# Patient Record
Sex: FEMALE | Race: BLACK OR AFRICAN AMERICAN | Employment: FULL TIME | URBAN - METROPOLITAN AREA
[De-identification: names, ages, dates, MRNs, and addresses within clinical notes are randomized per-mention and may not be internally consistent; named-entity substitution may affect disease eponyms.]

---

## 2017-04-11 ENCOUNTER — OFFICE VISIT (OUTPATIENT)
Dept: ORTHOPEDIC SURGERY | Age: 57
End: 2017-04-11

## 2017-04-11 VITALS
HEART RATE: 84 BPM | DIASTOLIC BLOOD PRESSURE: 62 MMHG | WEIGHT: 176 LBS | RESPIRATION RATE: 18 BRPM | OXYGEN SATURATION: 99 % | HEIGHT: 66 IN | SYSTOLIC BLOOD PRESSURE: 132 MMHG | BODY MASS INDEX: 28.28 KG/M2 | TEMPERATURE: 98.2 F

## 2017-04-11 DIAGNOSIS — M50.30 DDD (DEGENERATIVE DISC DISEASE), CERVICAL: ICD-10-CM

## 2017-04-11 DIAGNOSIS — M47.812 CERVICAL FACET JOINT SYNDROME: Primary | ICD-10-CM

## 2017-04-11 DIAGNOSIS — M79.18 MYOFASCIAL PAIN: ICD-10-CM

## 2017-04-11 DIAGNOSIS — Z72.0 TOBACCO USE: ICD-10-CM

## 2017-04-11 RX ORDER — GABAPENTIN 300 MG/1
CAPSULE ORAL
Refills: 2 | COMMUNITY
Start: 2017-03-27 | End: 2022-10-25

## 2017-04-11 RX ORDER — PANTOPRAZOLE SODIUM 40 MG/1
TABLET, DELAYED RELEASE ORAL AS NEEDED
Refills: 4 | COMMUNITY
Start: 2017-03-26

## 2017-04-11 RX ORDER — PREDNISONE 10 MG/1
TABLET ORAL
Qty: 32 TAB | Refills: 0 | Status: SHIPPED | OUTPATIENT
Start: 2017-04-11 | End: 2017-05-22 | Stop reason: ALTCHOICE

## 2017-04-11 RX ORDER — PREDNISONE 50 MG/1
TABLET ORAL
Refills: 0 | COMMUNITY
Start: 2017-03-26 | End: 2017-04-11 | Stop reason: ALTCHOICE

## 2017-04-11 RX ORDER — LISINOPRIL AND HYDROCHLOROTHIAZIDE 12.5; 2 MG/1; MG/1
TABLET ORAL
Refills: 4 | COMMUNITY
Start: 2017-03-26

## 2017-04-11 RX ORDER — CYCLOBENZAPRINE HCL 10 MG
10 TABLET ORAL EVERY EVENING
Qty: 30 TAB | Refills: 1 | Status: SHIPPED | OUTPATIENT
Start: 2017-04-11 | End: 2017-05-22 | Stop reason: SDUPTHER

## 2017-04-11 RX ORDER — DICLOFENAC SODIUM 75 MG/1
75 TABLET, DELAYED RELEASE ORAL 2 TIMES DAILY
Qty: 60 TAB | Refills: 1 | Status: SHIPPED | OUTPATIENT
Start: 2017-04-11 | End: 2017-09-09 | Stop reason: SDUPTHER

## 2017-04-11 RX ORDER — SIMVASTATIN 40 MG/1
TABLET, FILM COATED ORAL
Refills: 1 | COMMUNITY
Start: 2017-03-26 | End: 2021-03-25

## 2017-04-11 NOTE — MR AVS SNAPSHOT
Visit Information Date & Time Provider Department Dept. Phone Encounter #  
 4/11/2017  2:00 PM Reece Ponce MD [facility] [phone] [id number] Follow-up Instructions Return in about 4 weeks (around 5/9/2017) for Medication follow up. Upcoming Health Maintenance Date Due Hepatitis C Screening 1960 Pneumococcal 19-64 Medium Risk (1 of 1 - PPSV23) 7/3/1980 DTaP/Tdap/Td series (1 - Tdap) 7/3/1982 PAP AKA CERVICAL CYTOLOGY 7/3/1982 FOBT Q 1 YEAR AGE 50-75 7/3/2011 BREAST CANCER SCRN MAMMOGRAM 12/18/2014 INFLUENZA AGE 9 TO ADULT 8/1/2016 Allergies as of 4/11/2017  Review Complete On: 4/11/2017 By: Reece Ponce MD  
 No Known Allergies Current Immunizations  Never Reviewed No immunizations on file. Not reviewed this visit You Were Diagnosed With   
  
 Codes Comments Cervical facet joint syndrome    -  Primary ICD-10-CM: M12.88 ICD-9-CM: 723.8 Myofascial pain     ICD-10-CM: M79.1 ICD-9-CM: 729.1 DDD (degenerative disc disease), cervical     ICD-10-CM: M50.30 ICD-9-CM: 722.4 Tobacco use     ICD-10-CM: Z72.0 ICD-9-CM: 305.1 Vitals BP Pulse Temp Resp Height(growth percentile) Weight(growth percentile) 132/62 84 98.2 °F (36.8 °C) (Oral) 18 5' 6\" (1.676 m) 176 lb (79.8 kg) SpO2 BMI OB Status Smoking Status 99% 28.41 kg/m2 Menopause Current Every Day Smoker BMI and BSA Data Body Mass Index Body Surface Area  
 28.41 kg/m 2 1.93 m 2 Preferred Pharmacy Pharmacy Name Phone [id number] [place], [address] [facility] [phone] Your Updated Medication List  
  
   
This list is accurate as of: 4/11/17  3:15 PM.  Always use your most recent med list.  
  
  
  
  
 cyclobenzaprine 10 mg tablet Commonly known as:  FLEXERIL Take 1 Tab by mouth every evening. PRN Muscle Spasms. Indications: MUSCLE SPASM diclofenac EC 75 mg EC tablet Commonly known as:  VOLTAREN Take 1 Tab by mouth two (2) times a day.  
  
 gabapentin 300 mg capsule Commonly known as:  NEURONTIN  
TAKE 1 CAPSULE (300 MG) BY ORAL ROUTE 2 TIMES PER DAY  
  
 lisinopril-hydroCHLOROthiazide 20-12.5 mg per tablet Commonly known as:  PRINZIDE, ZESTORETIC  
TAKE ONE TABLET BY MOUTH ONCE A DAY pantoprazole 40 mg tablet Commonly known as:  PROTONIX  
TAKE ONE TABLET BY MOUTH ONCE A DAY  
  
 predniSONE 10 mg tablet Commonly known as:  DELTASONE  
6 pills Day1, 5 pills Day 2, 4 pills Day 3&4, 3 pills Day 5&6, 2 pills Day 7&8,1 pill Day 9&10, 1/2 pill Day 11&12.  
  
 simvastatin 40 mg tablet Commonly known as:  ZOCOR  
TAKE ONE TABLET BY MOUTH TABLET EVERY DAY IN THE EVENING Prescriptions Sent to Pharmacy Refills  
 diclofenac EC (VOLTAREN) 75 mg EC tablet 1 Sig: Take 1 Tab by mouth two (2) times a day. Class: Normal  
 Pharmacy: [facility] #[id number] [address] Ph #: [phone] Route: Oral  
 cyclobenzaprine (FLEXERIL) 10 mg tablet 1 Sig: Take 1 Tab by mouth every evening. PRN Muscle Spasms. Indications: MUSCLE SPASM Class: Normal  
 Pharmacy: [facility]/pharmacy #[id number] [address] Ph #: [phone] Route: Oral  
 predniSONE (DELTASONE) 10 mg tablet 0 Si pills Day1, 5 pills Day 2, 4 pills Day 3&4, 3 pills Day 5&6, 2 pills Day 7&8,1 pill Day 9&10, 1/2 pill Day 11&12. Class: Normal  
 Pharmacy: [facility]/pharmacy #[id number] [address] Ph #: [phone] Follow-up Instructions Return in about 4 weeks (around 2017) for Medication follow up. Patient Instructions Neck Arthritis: Exercises Your Care Instructions Here are some examples of typical rehabilitation exercises for your condition. Start each exercise slowly. Ease off the exercise if you start to have pain. Your doctor or physical therapist will tell you when you can start these exercises and which ones will work best for you. How to do the exercises Neck stretches to the side 1. This stretch works best if you keep your shoulder down as you lean away from it. To help you remember to do this, start by relaxing your shoulders and lightly holding on to your thighs or your chair. 2. Tilt your head toward your shoulder and hold for 15 to 30 seconds. Let the weight of your head stretch your muscles. 3. Repeat 2 to 4 times toward each shoulder. Chin tuck 1. Lie on the floor with a rolled-up towel under your neck. Your head should be touching the floor. 2. Slowly bring your chin toward your chest. 
3. Hold for a count of 6, and then relax for up to 10 seconds. 4. Repeat 8 to 12 times. Active cervical rotation 1. Sit in a firm chair, or stand up straight. 2. Keeping your chin level, turn your head to the right, and hold for 15 to 30 seconds. 3. Turn your head to the left and hold for 15 to 30 seconds. 4. Repeat 2 to 4 times to each side. Shoulder blade squeeze 1. While standing, squeeze your shoulder blades together. 2. Do not raise your shoulders up as you are squeezing. 3. Hold for 6 seconds. 4. Repeat 8 to 12 times. Shoulder rolls 1. Sit comfortably with your feet shoulder-width apart. You can also do this exercise standing up. 2. Roll your shoulders up, then back, and then down in a smooth, circular motion. 3. Repeat 2 to 4 times. Follow-up care is a key part of your treatment and safety. Be sure to make and go to all appointments, and call your doctor if you are having problems. It's also a good idea to know your test results and keep a list of the medicines you take. Where can you learn more? Go to http://steven-lewis.info/. Enter X272 in the search box to learn more about \"Neck Arthritis: Exercises. \" Current as of: May 23, 2016 Content Version: 11.2 © 5738-9593 Healthwise, [employer]. Care instructions adapted under license by [employer] (which disclaims liability or warranty for this information). If you have questions about a medical condition or this instruction, always ask your healthcare professional. [address] any warranty or liability for your use of this information. Learning About Benefits From Quitting Smoking How does quitting smoking make you healthier? If you're thinking about quitting smoking, you may have a few reasons to be smoke-free. Your health may be one of them. · When you quit smoking, you lower your risks for cancer, lung disease, heart attack, stroke, blood vessel disease, and blindness from macular degeneration. · When you're smoke-free, you get sick less often, and you heal faster. You are less likely to get colds, flu, bronchitis, and pneumonia. · As a nonsmoker, you may find that your mood is better and you are less stressed. When and how will you feel healthier? Quitting has real health benefits that start from day 1 of being smoke-free. And the longer you stay smoke-free, the healthier you get and the better you feel. The first hours · After just 20 minutes, your blood pressure and heart rate go down. That means there's less stress on your heart and blood vessels. · Within 12 hours, the level of carbon monoxide in your blood drops back to normal. That makes room for more oxygen. With more oxygen in your body, you may notice that you have more energy than when you smoked. After 2 weeks · Your lungs start to work better. · Your risk of heart attack starts to drop. After 1 month · When your lungs are clear, you cough less and breathe deeper, so it's easier to be active. · Your sense of taste and smell return. That means you can enjoy food more than you have since you started smoking. Over the years · After 1 year, your risk of heart disease is half what it would be if you kept smoking. · After 5 years, your risk of stroke starts to shrink. Within a few years after that, it's about the same as if you'd never smoked. · After 10 years, your risk of dying from lung cancer is cut by about half. And your risk for many other types of cancer is lower too. How would quitting help others in your life? When you quit smoking, you improve the health of everyone who now breathes in your smoke. · Their heart, lung, and cancer risks drop, much like yours. · They are sick less. For babies and small children, living smoke-free means they're less likely to have ear infections, pneumonia, and bronchitis. · If you're a woman who is or will be pregnant someday, quitting smoking means a healthier . · Children who are close to you are less likely to become adult smokers. Where can you learn more? Go to http://stevenInteractive Motion Technologieslewis.info/. Enter [id number] [id number] [id number] in the search box to learn more about \"Learning About Benefits From Quitting Smoking. \" Current as of: May 26, 2016 Content Version: 11.2 © 4926-2795 [employer]. Care instructions adapted under license by [employer] (which disclaims liability or warranty for this information). If you have questions about a medical condition or this instruction, always ask your healthcare professional. [place] [address] any warranty or liability for your use of this information. Stopping Smoking: Care Instructions Your Care Instructions Cigarette smokers crave the nicotine in cigarettes. Giving it up is much harder than simply changing a habit. Your body has to stop craving the nicotine. It is hard to quit, but you can do it. There are many tools that people use to quit smoking. You may find that combining tools works best for you. There are several steps to quitting. First you get ready to quit. Then you get support to help you.  After that, you learn new skills and behaviors to become a nonsmoker. For many people, a necessary step is getting and using medicine. Your doctor will help you set up the plan that best meets your needs. You may want to attend a smoking cessation program to help you quit smoking. When you choose a program, look for one that has proven success. Ask your doctor for ideas. You will greatly increase your chances of success if you take medicine as well as get counseling or join a cessation program. 
Some of the changes you feel when you first quit tobacco are uncomfortable. Your body will miss the nicotine at first, and you may feel short-tempered and grumpy. You may have trouble sleeping or concentrating. Medicine can help you deal with these symptoms. You may struggle with changing your smoking habits and rituals. The last step is the tricky one: Be prepared for the smoking urge to continue for a time. This is a lot to deal with, but keep at it. You will feel better. Follow-up care is a key part of your treatment and safety. Be sure to make and go to all appointments, and call your doctor if you are having problems. Its also a good idea to know your test results and keep a list of the medicines you take. How can you care for yourself at home? · Ask your family, friends, and coworkers for support. You have a better chance of quitting if you have help and support. · Join a support group, such as Nicotine Anonymous, for people who are trying to quit smoking. · Consider signing up for a smoking cessation program, such as the American Lung Association's Freedom from Smoking program. 
· Set a quit date. Pick your date carefully so that it is not right in the middle of a big deadline or stressful time. Once you quit, do not even take a puff. Get rid of all ashtrays and lighters after your last cigarette. Clean your house and your clothes so that they do not smell of smoke. · Learn how to be a nonsmoker.  Think about ways you can avoid those things that make you reach for a cigarette. ¨ Avoid situations that put you at greatest risk for smoking. For some people, it is hard to have a drink with friends without smoking. For others, they might skip a coffee break with coworkers who smoke. ¨ Change your daily routine. Take a different route to work or eat a meal in a different place. · Cut down on stress. Calm yourself or release tension by doing an activity you enjoy, such as reading a book, taking a hot bath, or gardening. · Talk to your doctor or pharmacist about nicotine replacement therapy, which replaces the nicotine in your body. You still get nicotine but you do not use tobacco. Nicotine replacement products help you slowly reduce the amount of nicotine you need. These products come in several forms, many of them available over-the-counter: ¨ Nicotine patches ¨ Nicotine gum and lozenges ¨ Nicotine inhaler · Ask your doctor about bupropion (Wellbutrin) or varenicline (Chantix), which are prescription medicines. They do not contain nicotine. They help you by reducing withdrawal symptoms, such as stress and anxiety. · Some people find hypnosis, acupuncture, and massage helpful for ending the smoking habit. · Eat a healthy diet and get regular exercise. Having healthy habits will help your body move past its craving for nicotine. · Be prepared to keep trying. Most people are not successful the first few times they try to quit. Do not get mad at yourself if you smoke again. Make a list of things you learned and think about when you want to try again, such as next week, next month, or next year. Where can you learn more? Go to http://steven-lewis.info/. Enter B441 in the search box to learn more about \"Stopping Smoking: Care Instructions. \" Current as of: May 26, 2016 Content Version: 11.2 © 3827-3201 [employer].  Care instructions adapted under license by [address] (which disclaims liability or warranty for this information). If you have questions about a medical condition or this instruction, always ask your healthcare professional. [address] any warranty or liability for your use of this information. Introducing [facility]! [facility] introduces Cherry patient portal. Now you can access parts of your medical record, email your doctor's office, and request medication refills online. 1. In your internet browser, go to [email or web address] 2. Click on the First Time User? Click Here link in the Sign In box. You will see the New Member Sign Up page. 3. Enter your Cherry Access Code exactly as it appears below. You will not need to use this code after youve completed the sign-up process. If you do not sign up before the expiration date, you must request a new code. · Cherry Access Code: [email or web address] Expires: 5/30/2017  6:37 PM 
 
4. Enter the last four digits of your Social Security Number (xxxx) and Date of Birth (mm/dd/yyyy) as indicated and click Submit. You will be taken to the next sign-up page. 5. Create a Cherry ID. This will be your Cherry login ID and cannot be changed, so think of one that is secure and easy to remember. 6. Create a Cherry password. You can change your password at any time. 7. Enter your Password Reset Question and Answer. This can be used at a later time if you forget your password. 8. Enter your e-mail address. You will receive e-mail notification when new information is available in [address] 9. Click Sign Up. You can now view and download portions of your medical record. 10. Click the Download Summary menu link to download a portable copy of your medical information. If you have questions, please visit the Frequently Asked Questions section of the Cherry website.  Remember, Cherry is NOT to be used for urgent needs. For medical emergencies, dial 911. Now available from your iPhone and Android! Please provide this summary of care documentation to your next provider. Your primary care clinician is listed as Braulio Hinson. If you have any questions after today's visit, please call [phone].

## 2017-04-11 NOTE — PROGRESS NOTES
[facility]  [address] [place], [facility] [address]  Phone: [phone]  Fax: [phone]          HISTORY OF PRESENT ILLNESS:  Elza Goldsmith is a 55 y.o. right handed female with history of cervical pain x 3 years. She c/o pain in the neck that radiates to the right shoulder and down the arm. Pt also c/o numbness below the right elbow. She states that her neck pain is most severe at this time and states that her pain has progressively worsened over the last 18 months. Her pain is better when sitting, standing, and lying down and worse when changing positions, coughing/sneezing, lifting, and bending forward. She admits that her arm occasionally shakes with heavy lifting. Pt denies any difficulty using buttons or issues with balance but admits that she trips about 7 x a day. She has tried physical therapy with minimal relief. Pt has tried OTC Tylenol, Advil, and BC powders. She currently takes Neurontin 300 mg 2 tabs daily. Pt admits to sedation with the Neurontin. She denies any renal issues. Pt has tried Flexeril QHS in the past with benefit. She has used Voltaren 1% gel in the past. Pt at this time desires to proceed with medication evaluation. Of note, Pt is a smoker. 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 Pain Scale: 4/10     PCP: Michelle Aggarwal MD      History reviewed. No pertinent past medical history. Social History     Social History    Marital status: UNKNOWN     Spouse name: N/A    Number of children: N/A    Years of education: N/A     Occupational History    Not on file.      Social History Main Topics    Smoking status: Current Every Day Smoker     Packs/day: 1.00     Years: 25.00     Types: Cigarettes    Smokeless tobacco: Never Used    Alcohol use No    Drug use: No    Sexual activity: Yes     Partners: Male     Other Topics Concern    Not on file     Social History Narrative    No narrative on file       Current Outpatient Prescriptions   Medication Sig Dispense Refill    gabapentin (NEURONTIN) 300 mg capsule TAKE 1 CAPSULE (300 MG) BY ORAL ROUTE 2 TIMES PER DAY  2    lisinopril-hydroCHLOROthiazide (PRINZIDE, ZESTORETIC) 20-12.5 mg per tablet TAKE ONE TABLET BY MOUTH ONCE A DAY  4    pantoprazole (PROTONIX) 40 mg tablet TAKE ONE TABLET BY MOUTH ONCE A DAY  4    simvastatin (ZOCOR) 40 mg tablet TAKE ONE TABLET BY MOUTH TABLET EVERY DAY IN THE EVENING  1    diclofenac EC (VOLTAREN) 75 mg EC tablet Take 1 Tab by mouth two (2) times a day. 60 Tab 1    cyclobenzaprine (FLEXERIL) 10 mg tablet Take 1 Tab by mouth every evening. PRN Muscle Spasms. Indications: MUSCLE SPASM 30 Tab 1    predniSONE (DELTASONE) 10 mg tablet 6 pills Day1, 5 pills Day 2, 4 pills Day 3&4, 3 pills Day 5&6, 2 pills Day 7&8,1 pill Day 9&10, 1/2 pill Day 11&12. 32 Tab 0       No Known Allergies    REVIEW OF SYSTEMS    Constitutional: Negative for fever, chills, or weight change. Respiratory: Negative for cough or shortness of breath. Cardiovascular: Negative for chest pain or palpitations. Gastrointestinal: Negative for acid reflux, change in bowel habits, or constipation. Genitourinary: Negative for dysuria and flank pain. Musculoskeletal: Positive for cervical pain. Skin: Negative for rash. Neurological: Positive for numbness below the right elbow. Negative for headaches, dizziness. Endo/Heme/Allergies: Negative for increased bruising. Psychiatric/Behavioral: Negative for difficulty with sleep. PHYSICAL EXAMINATION  Visit Vitals    /62    Pulse 84    Temp 98.2 °F (36.8 °C) (Oral)    Resp 18    Ht 5' 6\" (1.676 m)    Wt 176 lb (79.8 kg)    SpO2 99%    BMI 28.41 kg/m2       Constitutional: Awake, alert, and in no acute distress  HEENT: Normocephalic. Atraumatic. Oropharynx is moist and clear. PERRL. EOMI.  Sclerae are nonicteric  Heart: Regular rate and rhythm  Lungs: Clear to auscultation bilaterally  Abdomen: Soft and nontender. Bowel sounds are present  Neurological: 1+ symmetrical DTRs in the upper extremities. 1+ symmetrical DTRs in the lower extremities. Sensation to light touch is intact. Negative Lou's sign bilaterally. Skin: warm, dry, and intact. Musculoskeletal: Decreased left cervical flexion. Good ROM with right cervical flexion. Very tight across the upper trapezius with a trigger point in the left. No pain with extension, axial loading, or forward flexion. No pain with internal or external rotation of her hips. Negative straight leg raise bilaterally. Biceps  Triceps Deltoids Wrist Ext Wrist Flex Hand Intrin   Right +4/5 +4/5 +4/5 +4/5 +4/5 +4/5   Left +4/5 +4/5 +4/5 +4/5 +4/5 +4/5      Hip Flex  Quads Hamstrings Ankle DF EHL Ankle PF   Right +4/5 +4/5 +4/5 +4/5 +4/5 +4/5   Left +4/5 +4/5 +4/5 +4/5 +4/5 +4/5     IMAGING:    Cervical spine MRI from 03/16/2017 was personally reviewed with the Pt and demonstrated:    Results from Hospital Encounter encounter on 03/16/17   MRI CERV SPINE WO CONT   Narrative MRI OF THE CERVICAL SPINE    INDICATION: Spinal stenosis, cervical region. Chronic neck, bilateral shoulder,  and bilateral elbow pain. COMPARISON: No relevant studies. TECHNIQUE: Multiplanar, multisequence MRI of the cervical spine was performed  without intravenous contrast.    FINDINGS:    Osseous Structures: There is reversal of the normal cervical lordosis. There is  trace retrolisthesis at C5-C6. Small osteophytes are seen extending from C4  through C6. No abnormal marrow edema. Posterior fossa and Craniocervical Junction: Unremarkable. Spinal Cord: Normal Signal.    Levels:    C2-C3: No significant central canal or foraminal stenosis. C3-C4: No significant central canal or foraminal stenosis. C4-C5: No significant central canal or foraminal stenosis.     C5-C6: Diffuse disc osteophyte complex posterior lateral osteophytic ridging,  and left greater than right facet/joint hypertrophy. Moderate left foraminal  stenosis. Mild effacement of ventral CSF signal without central canal stenosis. C6-C7: Minimal disc bulge. Mild bilateral facet and uncovertebral joint  hypertrophy. No significant stenosis. C7-T1: No significant central canal or foraminal stenosis. Impression IMPRESSION:    1. Moderate left foraminal stenosis at C5-C6. 2.  No central canal stenosis at any level. Normal spinal cord signal.          ASSESSMENT   Brett Sands was seen today for neck pain and new patient. Diagnoses and all orders for this visit:    Cervical facet joint syndrome  -     diclofenac EC (VOLTAREN) 75 mg EC tablet; Take 1 Tab by mouth two (2) times a day. -     predniSONE (DELTASONE) 10 mg tablet; 6 pills Day1, 5 pills Day 2, 4 pills Day 3&4, 3 pills Day 5&6, 2 pills Day 7&8,1 pill Day 9&10, 1/2 pill Day 11&12. Myofascial pain  -     cyclobenzaprine (FLEXERIL) 10 mg tablet; Take 1 Tab by mouth every evening. PRN Muscle Spasms. Indications: MUSCLE SPASM    DDD (degenerative disc disease), cervical    Tobacco use         IMPRESSION AND PLAN:  Javon Medley is a 55 y.o. right handed female with history of cervical pain x 3 years. She c/o pain in the neck that radiates to the right shoulder and down the arm. Pt also c/o numbness below the right elbow. She states that her neck pain is most severe at this time and states that her pain has progressively worsened over the last 18 months. 1) Pt was given information on cervical arthritis exercises. 2) Discussed treatment options with the Pt, including physical therapy with dry needling and cervical steroid injections. 3) She was prescribed a large prednisone taper. 4) Pt was also prescribed Voltaren 75 mg 1 tab BID. 5) She was prescribed Flexeril 10 mg 1 tab QHS prn muscle spasm. 6) I recommended the Pt her Neurontin at night to address her daytime sedation.     7) I strongly encouraged the Pt to quit smoking and gave information on smoking cessation. 8) Ms. Bradly Wright has a reminder for a \"due or due soon\" health maintenance. I have asked that she contact her primary care provider, Allan Griffith MD, for follow-up on this health maintenance. 9)  reviewed. 10) Pt will follow-up in 1 month. Written by Elier Love, as dictated by Kelsey Campos MD.  I, Dr. Kelsey Campos confirm that all documentation is accurate.

## 2017-04-11 NOTE — PATIENT INSTRUCTIONS
Neck Arthritis: Exercises  Your Care Instructions  Here are some examples of typical rehabilitation exercises for your condition. Start each exercise slowly. Ease off the exercise if you start to have pain. Your doctor or physical therapist will tell you when you can start these exercises and which ones will work best for you. How to do the exercises  Neck stretches to the side    1. This stretch works best if you keep your shoulder down as you lean away from it. To help you remember to do this, start by relaxing your shoulders and lightly holding on to your thighs or your chair. 2. Tilt your head toward your shoulder and hold for 15 to 30 seconds. Let the weight of your head stretch your muscles. 3. Repeat 2 to 4 times toward each shoulder. Chin tuck    1. Lie on the floor with a rolled-up towel under your neck. Your head should be touching the floor. 2. Slowly bring your chin toward your chest.  3. Hold for a count of 6, and then relax for up to 10 seconds. 4. Repeat 8 to 12 times. Active cervical rotation    1. Sit in a firm chair, or stand up straight. 2. Keeping your chin level, turn your head to the right, and hold for 15 to 30 seconds. 3. Turn your head to the left and hold for 15 to 30 seconds. 4. Repeat 2 to 4 times to each side. Shoulder blade squeeze    1. While standing, squeeze your shoulder blades together. 2. Do not raise your shoulders up as you are squeezing. 3. Hold for 6 seconds. 4. Repeat 8 to 12 times. Shoulder rolls    1. Sit comfortably with your feet shoulder-width apart. You can also do this exercise standing up. 2. Roll your shoulders up, then back, and then down in a smooth, circular motion. 3. Repeat 2 to 4 times. Follow-up care is a key part of your treatment and safety. Be sure to make and go to all appointments, and call your doctor if you are having problems. It's also a good idea to know your test results and keep a list of the medicines you take.   Where can you learn more? Go to http://steven-lewis.info/. Enter Y811 in the search box to learn more about \"Neck Arthritis: Exercises. \"  Current as of: May 23, 2016  Content Version: 11.2  © 7237-1116 Limk. Care instructions adapted under license by [employer] (which disclaims liability or warranty for this information). If you have questions about a medical condition or this instruction, always ask your healthcare professional. [address] any warranty or liability for your use of this information. Learning About Benefits From Quitting Smoking  How does quitting smoking make you healthier? If you're thinking about quitting smoking, you may have a few reasons to be smoke-free. Your health may be one of them. · When you quit smoking, you lower your risks for cancer, lung disease, heart attack, stroke, blood vessel disease, and blindness from macular degeneration. · When you're smoke-free, you get sick less often, and you heal faster. You are less likely to get colds, flu, bronchitis, and pneumonia. · As a nonsmoker, you may find that your mood is better and you are less stressed. When and how will you feel healthier? Quitting has real health benefits that start from day 1 of being smoke-free. And the longer you stay smoke-free, the healthier you get and the better you feel. The first hours  · After just 20 minutes, your blood pressure and heart rate go down. That means there's less stress on your heart and blood vessels. · Within 12 hours, the level of carbon monoxide in your blood drops back to normal. That makes room for more oxygen. With more oxygen in your body, you may notice that you have more energy than when you smoked. After 2 weeks  · Your lungs start to work better. · Your risk of heart attack starts to drop. After 1 month  · When your lungs are clear, you cough less and breathe deeper, so it's easier to be active.   · Your sense of taste and smell return. That means you can enjoy food more than you have since you started smoking. Over the years  · After 1 year, your risk of heart disease is half what it would be if you kept smoking. · After 5 years, your risk of stroke starts to shrink. Within a few years after that, it's about the same as if you'd never smoked. · After 10 years, your risk of dying from lung cancer is cut by about half. And your risk for many other types of cancer is lower too. How would quitting help others in your life? When you quit smoking, you improve the health of everyone who now breathes in your smoke. · Their heart, lung, and cancer risks drop, much like yours. · They are sick less. For babies and small children, living smoke-free means they're less likely to have ear infections, pneumonia, and bronchitis. · If you're a woman who is or will be pregnant someday, quitting smoking means a healthier . · Children who are close to you are less likely to become adult smokers. Where can you learn more? Go to http://steven-lewis.info/. Enter 052 [id number] [id number] in the search box to learn more about \"Learning About Benefits From Quitting Smoking. \"  Current as of: May 26, 2016  Content Version: 11.2  © 5105-9536 Talyst, Incorporated. Care instructions adapted under license by [employer] (which disclaims liability or warranty for this information). If you have questions about a medical condition or this instruction, always ask your healthcare professional. [place] [address] any warranty or liability for your use of this information. Stopping Smoking: Care Instructions  Your Care Instructions  Cigarette smokers crave the nicotine in cigarettes. Giving it up is much harder than simply changing a habit. Your body has to stop craving the nicotine. It is hard to quit, but you can do it. There are many tools that people use to quit smoking.  You may find that combining tools works best for you. There are several steps to quitting. First you get ready to quit. Then you get support to help you. After that, you learn new skills and behaviors to become a nonsmoker. For many people, a necessary step is getting and using medicine. Your doctor will help you set up the plan that best meets your needs. You may want to attend a smoking cessation program to help you quit smoking. When you choose a program, look for one that has proven success. Ask your doctor for ideas. You will greatly increase your chances of success if you take medicine as well as get counseling or join a cessation program.  Some of the changes you feel when you first quit tobacco are uncomfortable. Your body will miss the nicotine at first, and you may feel short-tempered and grumpy. You may have trouble sleeping or concentrating. Medicine can help you deal with these symptoms. You may struggle with changing your smoking habits and rituals. The last step is the tricky one: Be prepared for the smoking urge to continue for a time. This is a lot to deal with, but keep at it. You will feel better. Follow-up care is a key part of your treatment and safety. Be sure to make and go to all appointments, and call your doctor if you are having problems. Its also a good idea to know your test results and keep a list of the medicines you take. How can you care for yourself at home? · Ask your family, friends, and coworkers for support. You have a better chance of quitting if you have help and support. · Join a support group, such as Nicotine Anonymous, for people who are trying to quit smoking. · Consider signing up for a smoking cessation program, such as the American Lung Association's Freedom from Smoking program.  · Set a quit date. Pick your date carefully so that it is not right in the middle of a big deadline or stressful time. Once you quit, do not even take a puff.  Get rid of all ashtrays and lighters after your last cigarette. Clean your house and your clothes so that they do not smell of smoke. · Learn how to be a nonsmoker. Think about ways you can avoid those things that make you reach for a cigarette. ¨ Avoid situations that put you at greatest risk for smoking. For some people, it is hard to have a drink with friends without smoking. For others, they might skip a coffee break with coworkers who smoke. ¨ Change your daily routine. Take a different route to work or eat a meal in a different place. · Cut down on stress. Calm yourself or release tension by doing an activity you enjoy, such as reading a book, taking a hot bath, or gardening. · Talk to your doctor or pharmacist about nicotine replacement therapy, which replaces the nicotine in your body. You still get nicotine but you do not use tobacco. Nicotine replacement products help you slowly reduce the amount of nicotine you need. These products come in several forms, many of them available over-the-counter:  ¨ Nicotine patches  ¨ Nicotine gum and lozenges  ¨ Nicotine inhaler  · Ask your doctor about bupropion (Wellbutrin) or varenicline (Chantix), which are prescription medicines. They do not contain nicotine. They help you by reducing withdrawal symptoms, such as stress and anxiety. · Some people find hypnosis, acupuncture, and massage helpful for ending the smoking habit. · Eat a healthy diet and get regular exercise. Having healthy habits will help your body move past its craving for nicotine. · Be prepared to keep trying. Most people are not successful the first few times they try to quit. Do not get mad at yourself if you smoke again. Make a list of things you learned and think about when you want to try again, such as next week, next month, or next year. Where can you learn more? Go to http://steven-lewis.info/. Enter K495 in the search box to learn more about \"Stopping Smoking: Care Instructions. \"  Current as of: May 26, 2016  Content Version: 11.2  © 4633-4707 Unigo, Incorporated. Care instructions adapted under license by [employer] (which disclaims liability or warranty for this information). If you have questions about a medical condition or this instruction, always ask your healthcare professional. [address] any warranty or liability for your use of this information.

## 2017-05-22 ENCOUNTER — OFFICE VISIT (OUTPATIENT)
Dept: ORTHOPEDIC SURGERY | Age: 57
End: 2017-05-22

## 2017-05-22 VITALS
BODY MASS INDEX: 28.41 KG/M2 | OXYGEN SATURATION: 96 % | WEIGHT: 176 LBS | HEART RATE: 90 BPM | SYSTOLIC BLOOD PRESSURE: 152 MMHG | TEMPERATURE: 98.6 F | RESPIRATION RATE: 18 BRPM | DIASTOLIC BLOOD PRESSURE: 67 MMHG

## 2017-05-22 DIAGNOSIS — Z72.0 TOBACCO USE: ICD-10-CM

## 2017-05-22 DIAGNOSIS — M62.838 MUSCLE SPASM: ICD-10-CM

## 2017-05-22 DIAGNOSIS — M47.812 CERVICAL FACET JOINT SYNDROME: Primary | ICD-10-CM

## 2017-05-22 DIAGNOSIS — M79.18 MYOFASCIAL PAIN: ICD-10-CM

## 2017-05-22 RX ORDER — CYCLOBENZAPRINE HCL 10 MG
10 TABLET ORAL EVERY EVENING
Qty: 30 TAB | Refills: 3 | Status: SHIPPED | OUTPATIENT
Start: 2017-05-22 | End: 2017-11-27 | Stop reason: SDUPTHER

## 2017-05-22 NOTE — PATIENT INSTRUCTIONS
Zova Activation    Thank you for requesting access to Zova. Please follow the instructions below to securely access and download your online medical record. Zova allows you to send messages to your doctor, view your test results, renew your prescriptions, schedule appointments, and more. How Do I Sign Up? 1. In your internet browser, go to [email or web address]  2. Click on the First Time User? Click Here link in the Sign In box. You will be redirect to the New Member Sign Up page. 3. Enter your Zova Access Code exactly as it appears below. You will not need to use this code after youve completed the sign-up process. If you do not sign up before the expiration date, you must request a new code. Zova Access Code: [email or web address]  Expires: 2017  6:37 PM (This is the date your Zova access code will )    4. Enter the last four digits of your Social Security Number (xxxx) and Date of Birth (mm/dd/yyyy) as indicated and click Submit. You will be taken to the next sign-up page. 5. Create a Zova ID. This will be your Zova login ID and cannot be changed, so think of one that is secure and easy to remember. 6. Create a Zova password. You can change your password at any time. 7. Enter your Password Reset Question and Answer. This can be used at a later time if you forget your password. 8. Enter your e-mail address. You will receive e-mail notification when new information is available in [address]. 9. Click Sign Up. You can now view and download portions of your medical record. 10. Click the Download Summary menu link to download a portable copy of your medical information. Additional Information    If you have questions, please visit the Frequently Asked Questions section of the Zova website at [email or web address] Remember, Zova is NOT to be used for urgent needs. For medical emergencies, dial 911.          Neck Arthritis: Exercises  Your Care Instructions  Here are some examples of typical rehabilitation exercises for your condition. Start each exercise slowly. Ease off the exercise if you start to have pain. Your doctor or physical therapist will tell you when you can start these exercises and which ones will work best for you. How to do the exercises  Neck stretches to the side    1. This stretch works best if you keep your shoulder down as you lean away from it. To help you remember to do this, start by relaxing your shoulders and lightly holding on to your thighs or your chair. 2. Tilt your head toward your shoulder and hold for 15 to 30 seconds. Let the weight of your head stretch your muscles. 3. Repeat 2 to 4 times toward each shoulder. Chin tuck    1. Lie on the floor with a rolled-up towel under your neck. Your head should be touching the floor. 2. Slowly bring your chin toward your chest.  3. Hold for a count of 6, and then relax for up to 10 seconds. 4. Repeat 8 to 12 times. Active cervical rotation    1. Sit in a firm chair, or stand up straight. 2. Keeping your chin level, turn your head to the right, and hold for 15 to 30 seconds. 3. Turn your head to the left and hold for 15 to 30 seconds. 4. Repeat 2 to 4 times to each side. Shoulder blade squeeze    1. While standing, squeeze your shoulder blades together. 2. Do not raise your shoulders up as you are squeezing. 3. Hold for 6 seconds. 4. Repeat 8 to 12 times. Shoulder rolls    1. Sit comfortably with your feet shoulder-width apart. You can also do this exercise standing up. 2. Roll your shoulders up, then back, and then down in a smooth, circular motion. 3. Repeat 2 to 4 times. Follow-up care is a key part of your treatment and safety. Be sure to make and go to all appointments, and call your doctor if you are having problems. It's also a good idea to know your test results and keep a list of the medicines you take. Where can you learn more?   Go to http://steven-lewis.info/. Enter T749 in the search box to learn more about \"Neck Arthritis: Exercises. \"  Current as of: May 23, 2016  Content Version: 11.2  © 5619-1746 SPOTBY.[employer], Incorporated. Care instructions adapted under license by [employer] (which disclaims liability or warranty for this information). If you have questions about a medical condition or this instruction, always ask your healthcare professional. [place] [address] any warranty or liability for your use of this information.

## 2017-05-22 NOTE — MR AVS SNAPSHOT
Visit Information Date & Time Provider Department Dept. Phone Encounter #  
 5/22/2017  2:50 PM Maye Lopez MD [facility] [phone] [id number] Follow-up Instructions Return in about 6 months (around 11/22/2017) for Medication follow up. Your Appointments 11/16/2017  1:15 PM  
Follow Up with Maye Lopez MD  
[facility]) Appt Note: 6 MONTH FU -  
 [address] [facility] [address]  
[phone]  
  
   
 [address] [facility] [address] Upcoming Health Maintenance Date Due Hepatitis C Screening 1960 Pneumococcal 19-64 Medium Risk (1 of 1 - PPSV23) 7/3/1980 DTaP/Tdap/Td series (1 - Tdap) 7/3/1982 PAP AKA CERVICAL CYTOLOGY 7/3/1982 FOBT Q 1 YEAR AGE 50-75 7/3/2011 BREAST CANCER SCRN MAMMOGRAM 12/18/2014 INFLUENZA AGE 9 TO ADULT 8/1/2017 Allergies as of 5/22/2017  Review Complete On: 5/22/2017 By: Maye Lopez MD  
 No Known Allergies Current Immunizations  Never Reviewed No immunizations on file. Not reviewed this visit You Were Diagnosed With   
  
 Codes Comments Cervical facet joint syndrome    -  Primary ICD-10-CM: M12.88 ICD-9-CM: 723.8 Myofascial pain     ICD-10-CM: M79.1 ICD-9-CM: 729.1 Tobacco use     ICD-10-CM: Z72.0 ICD-9-CM: 305.1 Muscle spasm     ICD-10-CM: A36.720 ICD-9-CM: 728.85 Vitals BP Pulse Temp Resp Weight(growth percentile) SpO2  
 152/67 90 98.6 °F (37 °C) (Oral) 18 176 lb (79.8 kg) 96% BMI OB Status Smoking Status 28.41 kg/m2 Menopause Current Every Day Smoker BMI and BSA Data Body Mass Index Body Surface Area  
 28.41 kg/m 2 1.93 m 2 Preferred Pharmacy Pharmacy Name Phone [id number], [address] [facility] [phone] Your Updated Medication List  
  
 This list is accurate as of: 5/22/17  4:26 PM.  Always use your most recent med list.  
  
  
  
  
 cyclobenzaprine 10 mg tablet Commonly known as:  FLEXERIL Take 1 Tab by mouth every evening. PRN Muscle Spasms. Indications: MUSCLE SPASM  
  
 diclofenac EC 75 mg EC tablet Commonly known as:  VOLTAREN Take 1 Tab by mouth two (2) times a day.  
  
 gabapentin 300 mg capsule Commonly known as:  NEURONTIN  
TAKE 1 CAPSULE (300 MG) BY ORAL ROUTE 2 TIMES PER DAY  
  
 lisinopril-hydroCHLOROthiazide 20-12.5 mg per tablet Commonly known as:  PRINZIDE, ZESTORETIC  
TAKE ONE TABLET BY MOUTH ONCE A DAY pantoprazole 40 mg tablet Commonly known as:  PROTONIX  
TAKE ONE TABLET BY MOUTH ONCE A DAY  
  
 simvastatin 40 mg tablet Commonly known as:  ZOCOR  
TAKE ONE TABLET BY MOUTH TABLET EVERY DAY IN THE EVENING Prescriptions Printed Refills  
 cyclobenzaprine (FLEXERIL) 10 mg tablet 3 Sig: Take 1 Tab by mouth every evening. PRN Muscle Spasms. Indications: MUSCLE SPASM Class: Print Route: Oral  
  
Follow-up Instructions Return in about 6 months (around 11/22/2017) for Medication follow up. Patient Instructions Nervana Systems Activation Thank you for requesting access to Nervana Systems. Please follow the instructions below to securely access and download your online medical record. Nervana Systems allows you to send messages to your doctor, view your test results, renew your prescriptions, schedule appointments, and more. How Do I Sign Up? 1. In your internet browser, go to [email or web address] 
2. Click on the First Time User? Click Here link in the Sign In box. You will be redirect to the New Member Sign Up page. 3. Enter your Nervana Systems Access Code exactly as it appears below. You will not need to use this code after youve completed the sign-up process. If you do not sign up before the expiration date, you must request a new code. Fannect Access Code: [email or web address] Expires: 2017  6:37 PM (This is the date your Fannect access code will ) 4. Enter the last four digits of your Social Security Number (xxxx) and Date of Birth (mm/dd/yyyy) as indicated and click Submit. You will be taken to the next sign-up page. 5. Create a Fannect ID. This will be your Fannect login ID and cannot be changed, so think of one that is secure and easy to remember. 6. Create a Fannect password. You can change your password at any time. 7. Enter your Password Reset Question and Answer. This can be used at a later time if you forget your password. 8. Enter your e-mail address. You will receive e-mail notification when new information is available in [address] 9. Click Sign Up. You can now view and download portions of your medical record. 10. Click the Download Summary menu link to download a portable copy of your medical information. Additional Information If you have questions, please visit the Frequently Asked Questions section of the Fannect website at [email or web address] Remember, Fannect is NOT to be used for urgent needs. For medical emergencies, dial 911. Neck Arthritis: Exercises Your Care Instructions Here are some examples of typical rehabilitation exercises for your condition. Start each exercise slowly. Ease off the exercise if you start to have pain. Your doctor or physical therapist will tell you when you can start these exercises and which ones will work best for you. How to do the exercises Neck stretches to the side 1. This stretch works best if you keep your shoulder down as you lean away from it. To help you remember to do this, start by relaxing your shoulders and lightly holding on to your thighs or your chair. 2. Tilt your head toward your shoulder and hold for 15 to 30 seconds. Let the weight of your head stretch your muscles. 3. Repeat 2 to 4 times toward each shoulder. Chin tuck 1. Lie on the floor with a rolled-up towel under your neck. Your head should be touching the floor. 2. Slowly bring your chin toward your chest. 
3. Hold for a count of 6, and then relax for up to 10 seconds. 4. Repeat 8 to 12 times. Active cervical rotation 1. Sit in a firm chair, or stand up straight. 2. Keeping your chin level, turn your head to the right, and hold for 15 to 30 seconds. 3. Turn your head to the left and hold for 15 to 30 seconds. 4. Repeat 2 to 4 times to each side. Shoulder blade squeeze 1. While standing, squeeze your shoulder blades together. 2. Do not raise your shoulders up as you are squeezing. 3. Hold for 6 seconds. 4. Repeat 8 to 12 times. Shoulder rolls 1. Sit comfortably with your feet shoulder-width apart. You can also do this exercise standing up. 2. Roll your shoulders up, then back, and then down in a smooth, circular motion. 3. Repeat 2 to 4 times. Follow-up care is a key part of your treatment and safety. Be sure to make and go to all appointments, and call your doctor if you are having problems. It's also a good idea to know your test results and keep a list of the medicines you take. Where can you learn more? Go to http://steven-lewis.info/. Enter T387 in the search box to learn more about \"Neck Arthritis: Exercises. \" Current as of: May 23, 2016 Content Version: 11.2 © 9954-7119 registracija vozila, Incorporated. Care instructions adapted under license by [employer] for Job (which disclaims liability or warranty for this information). If you have questions about a medical condition or this instruction, always ask your healthcare professional. [place] [address] any warranty or liability for your use of this information. Introducing \[facility] [place] [facility]\"" & HEALTH SERVICES!    
 Hannah Tomlinson introduces Nutrinsic patient portal. Now you can access parts of your medical record, email your doctor's office, and request medication refills online. 1. In your internet browser, go to [email or web address] 2. Click on the First Time User? Click Here link in the Sign In box. You will see the New Member Sign Up page. 3. Enter your Jetbay Access Code exactly as it appears below. You will not need to use this code after youve completed the sign-up process. If you do not sign up before the expiration date, you must request a new code. · Jetbay Access Code: [email or web address] Expires: 5/30/2017  6:37 PM 
 
4. Enter the last four digits of your Social Security Number (xxxx) and Date of Birth (mm/dd/yyyy) as indicated and click Submit. You will be taken to the next sign-up page. 5. Create a Jetbay ID. This will be your Jetbay login ID and cannot be changed, so think of one that is secure and easy to remember. 6. Create a Jetbay password. You can change your password at any time. 7. Enter your Password Reset Question and Answer. This can be used at a later time if you forget your password. 8. Enter your e-mail address. You will receive e-mail notification when new information is available in [address] 9. Click Sign Up. You can now view and download portions of your medical record. 10. Click the Download Summary menu link to download a portable copy of your medical information. If you have questions, please visit the Frequently Asked Questions section of the Jetbay website. Remember, Jetbay is NOT to be used for urgent needs. For medical emergencies, dial 911. Now available from your iPhone and Android! Please provide this summary of care documentation to your next provider. Your primary care clinician is listed as Chitra Desai. If you have any questions after today's visit, please call [phone].

## 2017-05-22 NOTE — PROGRESS NOTES
[facility]  [address] [place], [address]  Phone: [phone]  Fax: [phone]      ASSESSMENT   Malgorzata Martínez was seen today for neck pain. Diagnoses and all orders for this visit:    Cervical facet joint syndrome    Myofascial pain  -     cyclobenzaprine (FLEXERIL) 10 mg tablet; Take 1 Tab by mouth every evening. PRN Muscle Spasms. Indications: MUSCLE SPASM    Tobacco use    Muscle spasm         IMPRESSION AND PLAN:  Dillon Griffin is a 55 y.o. female with history of cervical pain. She continues to experience some neck pain and stiffness but this has improved since her last office visit. Pt tried a large prednisone taper since her last office visit and experienced significant benefit. 1) Pt was given information on cervical arthritis exercises. 2) She will continue taking Voltaren 75 mg and Neurontin 300 mg as prescribed and does not need refills at this time. 3) Pt received a refill of Flexeril 10 mg 1 tab QHS prn muscle spasm. 4) I recommended the Pt try beginner's yoga, lesvia chi, and water exercise. 5) Pt was given information on how to use a TheraCane. 6) Ms. Sherryle Hoit has a reminder for a \"due or due soon\" health maintenance. I have asked that she contact her primary care provider, Franki Mcgee MD, for follow-up on this health maintenance. 7)  demonstrated consistency with prescribing. 8) Pt will follow-up in 6 months or sooner if needed. HISTORY OF PRESENT ILLNESS:  Dillon Griffin is a 55 y.o. female with history of cervical pain. She continues to experience some neck pain and stiffness but this has improved since her last office visit. Pt reports that she occasionally stumbles while working. She tried a large prednisone taper since her last office visit and experienced significant benefit. Pt has also been taking Voltaren 75 mg with some improvement and she denies any stomach issues with the medication.  Pt takes Flexeril 10 mg at night with benefit and denies any sedation. She adjusted her Neurontin 300 mg to 2 tabs QHS and reports that she no longer experiences daytime sedation. Pt at this time desires to continue with current care. Pain Scale: 1/10    PCP: Felisha Dunbar MD       History reviewed. No pertinent past medical history. Social History     Social History    Marital status: UNKNOWN     Spouse name: N/A    Number of children: N/A    Years of education: N/A     Occupational History    Not on file. Social History Main Topics    Smoking status: Current Every Day Smoker     Packs/day: 1.00     Years: 25.00     Types: Cigarettes    Smokeless tobacco: Never Used    Alcohol use No    Drug use: No    Sexual activity: Yes     Partners: Male     Other Topics Concern    Not on file     Social History Narrative       Current Outpatient Prescriptions   Medication Sig Dispense Refill    cyclobenzaprine (FLEXERIL) 10 mg tablet Take 1 Tab by mouth every evening. PRN Muscle Spasms. Indications: MUSCLE SPASM 30 Tab 3    gabapentin (NEURONTIN) 300 mg capsule TAKE 1 CAPSULE (300 MG) BY ORAL ROUTE 2 TIMES PER DAY  2    lisinopril-hydroCHLOROthiazide (PRINZIDE, ZESTORETIC) 20-12.5 mg per tablet TAKE ONE TABLET BY MOUTH ONCE A DAY  4    pantoprazole (PROTONIX) 40 mg tablet TAKE ONE TABLET BY MOUTH ONCE A DAY  4    simvastatin (ZOCOR) 40 mg tablet TAKE ONE TABLET BY MOUTH TABLET EVERY DAY IN THE EVENING  1    diclofenac EC (VOLTAREN) 75 mg EC tablet Take 1 Tab by mouth two (2) times a day. 60 Tab 1       No Known Allergies      REVIEW OF SYSTEMS    Constitutional: Negative for fever, chills, or weight change. Respiratory: Negative for cough or shortness of breath. Cardiovascular: Negative for chest pain or palpitations. Gastrointestinal: Negative for acid reflux, change in bowel habits, or constipation. Genitourinary: Negative for dysuria and flank pain.    Musculoskeletal: Positive for cervical pain.  Skin: Negative for rash. Neurological: Negative for headaches, dizziness, or numbness. Endo/Heme/Allergies: Negative for increased bruising. Psychiatric/Behavioral: Negative for difficulty with sleep. PHYSICAL EXAMINATION  Visit Vitals    /67    Pulse 90    Temp 98.6 °F (37 °C) (Oral)    Resp 18    Wt 176 lb (79.8 kg)    SpO2 96%    BMI 28.41 kg/m2       Constitutional: Awake, alert, and in no acute distress  Neurological: 1+ symmetrical DTRs in the upper extremities. Sensation to light touch is intact. Negative Lou's sign bilaterally. Skin: warm, dry, and intact. Musculoskeletal: Decreased left cervical flexion, otherwise good ROM in the cervical spine. Tight across the upper trapezius with a trigger point on the left. Biceps  Triceps Deltoids Wrist Ext Wrist Flex Hand Intrin   Right +4/5 +4/5 +4/5 +4/5 +4/5 +4/5   Left +4/5 +4/5 +4/5 +4/5 +4/5 +4/5     IMAGING:    Cervical spine MRI from 03/16/2017 was personally reviewed with the Pt and demonstrated:    Results from Hospital Encounter encounter on 03/16/17   MRI CERV SPINE WO CONT   Narrative MRI OF THE CERVICAL SPINE    INDICATION: Spinal stenosis, cervical region. Chronic neck, bilateral shoulder,  and bilateral elbow pain. COMPARISON: No relevant studies. TECHNIQUE: Multiplanar, multisequence MRI of the cervical spine was performed  without intravenous contrast.    FINDINGS:    Osseous Structures: There is reversal of the normal cervical lordosis. There is  trace retrolisthesis at C5-C6. Small osteophytes are seen extending from C4  through C6. No abnormal marrow edema. Posterior fossa and Craniocervical Junction: Unremarkable. Spinal Cord: Normal Signal.    Levels:    C2-C3: No significant central canal or foraminal stenosis. C3-C4: No significant central canal or foraminal stenosis. C4-C5: No significant central canal or foraminal stenosis.     C5-C6: Diffuse disc osteophyte complex posterior lateral osteophytic ridging,  and left greater than right facet/joint hypertrophy. Moderate left foraminal  stenosis. Mild effacement of ventral CSF signal without central canal stenosis. C6-C7: Minimal disc bulge. Mild bilateral facet and uncovertebral joint  hypertrophy. No significant stenosis. C7-T1: No significant central canal or foraminal stenosis. Impression IMPRESSION:    1. Moderate left foraminal stenosis at C5-C6. 2.  No central canal stenosis at any level. Normal spinal cord signal.          Written by Chito Vallejo, as dictated by Tom Diaz MD.  I, Dr. Tom Diaz confirm that all documentation is accurate.

## 2017-09-09 DIAGNOSIS — M47.812 CERVICAL FACET JOINT SYNDROME: ICD-10-CM

## 2017-09-11 RX ORDER — DICLOFENAC SODIUM 75 MG/1
TABLET, DELAYED RELEASE ORAL
Qty: 60 TAB | Refills: 1 | Status: SHIPPED | OUTPATIENT
Start: 2017-09-11 | End: 2018-01-15 | Stop reason: SDUPTHER

## 2018-01-10 DIAGNOSIS — M79.18 MYOFASCIAL PAIN: ICD-10-CM

## 2018-01-10 DIAGNOSIS — M62.838 MUSCLE SPASM: ICD-10-CM

## 2018-01-10 RX ORDER — CYCLOBENZAPRINE HCL 10 MG
10 TABLET ORAL EVERY EVENING
Qty: 30 TAB | Refills: 0 | OUTPATIENT
Start: 2018-01-10

## 2018-01-10 NOTE — TELEPHONE ENCOUNTER
Last Visit: 05/22/2017 with MD Tinsley    Next Appointment: noted to f/u in 6 months; No show 11/14 & 12/04; Provider Cancellation 11/16  Previous Refill Encounters: 11/27/2017 per NP Mario #[id number]    Requested Prescriptions     Pending Prescriptions Disp Refills    cyclobenzaprine (FLEXERIL) 10 mg tablet 30 Tab 0     Sig: Take 1 Tab by mouth every evening.  as needed  Indications: Muscle Spasm

## 2018-01-10 NOTE — TELEPHONE ENCOUNTER
Please contact the patient for scheduling per refusal reason below. Thanks.      Refused by: Jasmin Luz NP  Refusal reason: Appt required, please call patient

## 2018-01-15 ENCOUNTER — OFFICE VISIT (OUTPATIENT)
Dept: ORTHOPEDIC SURGERY | Age: 58
End: 2018-01-15

## 2018-01-15 VITALS
TEMPERATURE: 98.4 F | HEART RATE: 85 BPM | DIASTOLIC BLOOD PRESSURE: 63 MMHG | HEIGHT: 66 IN | WEIGHT: 176.8 LBS | SYSTOLIC BLOOD PRESSURE: 128 MMHG | OXYGEN SATURATION: 97 % | RESPIRATION RATE: 18 BRPM | BODY MASS INDEX: 28.42 KG/M2

## 2018-01-15 DIAGNOSIS — M62.838 MUSCLE SPASM: ICD-10-CM

## 2018-01-15 DIAGNOSIS — M79.18 MYOFASCIAL PAIN: Primary | ICD-10-CM

## 2018-01-15 DIAGNOSIS — M47.812 CERVICAL FACET JOINT SYNDROME: ICD-10-CM

## 2018-01-15 RX ORDER — CYCLOBENZAPRINE HCL 10 MG
10 TABLET ORAL
Qty: 30 TAB | Refills: 5 | Status: SHIPPED | OUTPATIENT
Start: 2018-01-15 | End: 2018-06-30 | Stop reason: SDUPTHER

## 2018-01-15 RX ORDER — DICLOFENAC SODIUM 75 MG/1
75 TABLET, DELAYED RELEASE ORAL
Qty: 60 TAB | Refills: 5 | Status: SHIPPED | OUTPATIENT
Start: 2018-01-15 | End: 2018-06-22 | Stop reason: SDUPTHER

## 2018-01-15 NOTE — MR AVS SNAPSHOT
Visit Information Date & Time Provider Department Dept. Phone Encounter #  
 1/15/2018  9:00 AM Catherine Dong NP [facility] [phone] [id number] Follow-up Instructions Return in about 6 months (around 7/15/2018). Your Appointments 7/11/2018  8:30 AM  
Follow Up with MD HORACIO [facility] ONE ([address] [facility]) Appt Note: [address]  
 [address] [facility] [address]  
[phone]  
  
   
 [address] [facility] [address] Upcoming Health Maintenance Date Due Hepatitis C Screening 1960 Pneumococcal 19-64 Medium Risk (1 of 1 - PPSV23) 7/3/1980 DTaP/Tdap/Td series (1 - Tdap) 7/3/1982 PAP AKA CERVICAL CYTOLOGY 7/3/1982 FOBT Q 1 YEAR AGE 50-75 7/3/2011 Influenza Age 5 to Adult 8/1/2017 BREAST CANCER SCRN MAMMOGRAM 8/16/2019 Allergies as of 1/15/2018  Review Complete On: 1/15/2018 By: James Yen No Known Allergies Current Immunizations  Never Reviewed No immunizations on file. Not reviewed this visit You Were Diagnosed With   
  
 Codes Comments Myofascial pain    -  Primary ICD-10-CM: M79.1 ICD-9-CM: 729.1 Cervical facet joint syndrome     ICD-10-CM: M12.88 ICD-9-CM: 723.8 Muscle spasm     ICD-10-CM: X24.310 ICD-9-CM: 728.85 Vitals BP Pulse Temp Resp Height(growth percentile) Weight(growth percentile) 128/63 85 98.4 °F (36.9 °C) (Oral) 18 5' 6\" (1.676 m) 176 lb 12.8 oz (80.2 kg) SpO2 BMI OB Status Smoking Status 97% 28.54 kg/m2 Menopause Current Every Day Smoker BMI and BSA Data Body Mass Index Body Surface Area 28.54 kg/m 2 1.93 m 2 Preferred Pharmacy Pharmacy Name Phone [id number] [address], [address] [facility] [phone] Your Updated Medication List  
  
   
 This list is accurate as of: 1/15/18  9:22 AM.  Always use your most recent med list.  
  
  
  
  
 cyclobenzaprine 10 mg tablet Commonly known as:  FLEXERIL Take 1 Tab by mouth nightly as needed for Muscle Spasm(s). diclofenac EC 75 mg EC tablet Commonly known as:  VOLTAREN Take 1 Tab by mouth two (2) times daily as needed. With meals  
  
 gabapentin 300 mg capsule Commonly known as:  NEURONTIN  
TAKE 1 CAPSULE (300 MG) BY ORAL ROUTE 2 TIMES PER DAY  
  
 lisinopril-hydroCHLOROthiazide 20-12.5 mg per tablet Commonly known as:  PRINZIDE, ZESTORETIC  
TAKE ONE TABLET BY MOUTH ONCE A DAY pantoprazole 40 mg tablet Commonly known as:  PROTONIX  
TAKE ONE TABLET BY MOUTH ONCE A DAY  
  
 simvastatin 40 mg tablet Commonly known as:  ZOCOR  
TAKE ONE TABLET BY MOUTH TABLET EVERY DAY IN THE EVENING Prescriptions Sent to Pharmacy Refills  
 diclofenac EC (VOLTAREN) 75 mg EC tablet 5 Sig: Take 1 Tab by mouth two (2) times daily as needed. With meals Class: Normal  
 Pharmacy: [id number] [address] Ph #: [phone] Route: Oral  
 cyclobenzaprine (FLEXERIL) 10 mg tablet 5 Sig: Take 1 Tab by mouth nightly as needed for Muscle Spasm(s). Class: Normal  
 Pharmacy: [facility]/pharmacy #[id number] [address] Ph #: [phone] Route: Oral  
  
Follow-up Instructions Return in about 6 months (around 7/15/2018). Patient Instructions Neck Arthritis: Exercises Your Care Instructions Here are some examples of typical rehabilitation exercises for your condition. Start each exercise slowly. Ease off the exercise if you start to have pain. Your doctor or physical therapist will tell you when you can start these exercises and which ones will work best for you. How to do the exercises Neck stretches to the side 1.  This stretch works best if you keep your shoulder down as you lean away from it. To help you remember to do this, start by relaxing your shoulders and lightly holding on to your thighs or your chair. 2. Tilt your head toward your shoulder and hold for 15 to 30 seconds. Let the weight of your head stretch your muscles. 3. Repeat 2 to 4 times toward each shoulder. Chin tuck 1. Lie on the floor with a rolled-up towel under your neck. Your head should be touching the floor. 2. Slowly bring your chin toward your chest. 
3. Hold for a count of 6, and then relax for up to 10 seconds. 4. Repeat 8 to 12 times. Active cervical rotation 1. Sit in a firm chair, or stand up straight. 2. Keeping your chin level, turn your head to the right, and hold for 15 to 30 seconds. 3. Turn your head to the left and hold for 15 to 30 seconds. 4. Repeat 2 to 4 times to each side. Shoulder blade squeeze 1. While standing, squeeze your shoulder blades together. 2. Do not raise your shoulders up as you are squeezing. 3. Hold for 6 seconds. 4. Repeat 8 to 12 times. Shoulder rolls 1. Sit comfortably with your feet shoulder-width apart. You can also do this exercise standing up. 2. Roll your shoulders up, then back, and then down in a smooth, circular motion. 3. Repeat 2 to 4 times. Follow-up care is a key part of your treatment and safety. Be sure to make and go to all appointments, and call your doctor if you are having problems. It's also a good idea to know your test results and keep a list of the medicines you take. Where can you learn more? Go to http://steven-lewis.info/. Enter B152 in the search box to learn more about \"Neck Arthritis: Exercises. \" Current as of: March 21, 2017 Content Version: 11.4 © 4984-7097 Healthwise, Incorporated. Care instructions adapted under license by [employer] (which disclaims liability or warranty for this information).  If you have questions about a medical condition or this instruction, always ask your healthcare professional. [facility] any warranty or liability for your use of this information. Introducing [facility]! [employer] introduces Cartasite patient portal. Now you can access parts of your medical record, email your doctor's office, and request medication refills online. 1. In your internet browser, go to [email or web address] 2. Click on the First Time User? Click Here link in the Sign In box. You will see the New Member Sign Up page. 3. Enter your Cartasite Access Code exactly as it appears below. You will not need to use this code after youve completed the sign-up process. If you do not sign up before the expiration date, you must request a new code. · Cartasite Access Code: [email or web address] Expires: 4/15/2018  9:22 AM 
 
4. Enter the last four digits of your Social Security Number (xxxx) and Date of Birth (mm/dd/yyyy) as indicated and click Submit. You will be taken to the next sign-up page. 5. Create a Cartasite ID. This will be your Cartasite login ID and cannot be changed, so think of one that is secure and easy to remember. 6. Create a Cartasite password. You can change your password at any time. 7. Enter your Password Reset Question and Answer. This can be used at a later time if you forget your password. 8. Enter your e-mail address. You will receive e-mail notification when new information is available in [address] 9. Click Sign Up. You can now view and download portions of your medical record. 10. Click the Download Summary menu link to download a portable copy of your medical information. If you have questions, please visit the Frequently Asked Questions section of the Cartasite website. Remember, Cartasite is NOT to be used for urgent needs. For medical emergencies, dial 911. Now available from your iPhone and Android! Please provide this summary of care documentation to your next provider. Your primary care clinician is listed as Isaura Sen. If you have any questions after today's visit, please call [phone].

## 2018-01-15 NOTE — PROGRESS NOTES
[facility]  [address]  [facility], [address]  Phone: [phone]  Fax: [phone]  PROGRESS NOTE  Patient: Sarah Quiros                MRN: [id number]       SSN: [id number]  YOB: 1960        AGE: 65 y.o. SEX: female  Body mass index is 28.54 kg/(m^2). PCP: Racquel Aguilera MD  01/15/18    Chief Complaint   Patient presents with    Neck Pain     follow up       HISTORY OF PRESENT ILLNESS:  Sarah Quiros is a 65 y.o.  female with history of neck pain for 4 years with previous RUE radicular pain that has now resolved except some intermittent right elbow pain. Prior history of neck problems: facet arthropathy and myofascial pain, and DDD. Symptoms are worst: mid-day, afternoon. Pain is worse with looking up, looking down, manual/sedentary work, riding /driving car or truck, pushing, pulling and affects work and recreational activities. Pain is better with massage, relaxation and heating pad. She reports improved pain since her last OV. She has been to PT and continues with a HEP. She uses massage and heat. Her pain is arthritic and myofascial in nature. She is neurologically intact without any weakness, no arm pain, except some right elbow pain that is intermittent. Her RUE radicular pain has resolved. She denies any dexterity issues. She reports some balance issues that I believe is due to more of her peripheral neuropathy. She describes it as more of a mis-step, she has has BLE foot numbness. States she has been using Voltaren 75mg 1-2xday, Flexeril 10mg QHS, and Gabapentin 300mg BID (by her PCP) with moderate, complete, Relief. She gets her Gabapentin from her PCP for her neuropathy in her feet. Her BLE neuropathy sometimes causes her feet to catch. . Denies bladder/bowel dysfunction, saddle paresthesia, weakness, gait disturbance, or other neurological deficits.  Denies chills, fever,night sweats, unexplained weight loss/weight gain, chest pain, sob or anxiety. Reports no new medical issues or hospitalizations since the last visit. Pt at this time desires to  continue with current care/proceed with medication evaluation/proceed with Neck pain. ASSESSMENT   Diagnoses and all orders for this visit:    1. Myofascial pain  -     cyclobenzaprine (FLEXERIL) 10 mg tablet; Take 1 Tab by mouth nightly as needed for Muscle Spasm(s). 2. Cervical facet joint syndrome  -     diclofenac EC (VOLTAREN) 75 mg EC tablet; Take 1 Tab by mouth two (2) times daily as needed. With meals    3. Muscle spasm  -     cyclobenzaprine (FLEXERIL) 10 mg tablet; Take 1 Tab by mouth nightly as needed for Muscle Spasm(s). IMPRESSION AND PLAN:  This is a pt with cervical arthropathy and DDD and myofascial pain who has has significantly improved since last OV. She reports improved pain, she is neurologically intact. We will continue her current treatment. We may consider PT w/dry needling if she experiences increased myofascial pain, she has has a lot of muscular tension to her left Trapezius. 1) Pt was given information on cervical arthritis exercises   2) Continue Flexeril and Voltaren  3) Continue HEP  4) Ms. Abhijit Trujillo has a reminder for a \"due or due soon\" health maintenance. I have asked that she contact her primary care provider, Lita Bell MD, for follow-up on this health maintenance. 5) We have informed patient to notify us for immediate appointment if he has any worsening neurogical symptoms or if an emergency situation presents, then call 911  6)  has been reviewed and is appropriate  7) Pt will follow-up in 6 Mo w/me.     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 Respiratory: Negative for cough or shortness of breath. Cardiovascular: Negative for chest pain or palpitations. Gastrointestinal: Negative for incontinence, acid reflux, change in bowel habits, or constipation. Genitourinary: Negative for incontinence, dysuria and flank pain. Musculoskeletal: Positive for Neck pain. See HPI. Skin: Negative for rash. Neurological:no  radiculopathy. Bilateral foot neuropathy. See HPI. Endo/Heme/Allergies: Negative. Psychiatric/Behavioral: Negative. PHYSICAL EXAMINATION  Visit Vitals    /63    Pulse 85    Temp 98.4 °F (36.9 °C) (Oral)    Resp 18    Ht 5' 6\" (1.676 m)    Wt 176 lb 12.8 oz (80.2 kg)    SpO2 97%    BMI 28.54 kg/m2         Accompanied by self. Constitutional:  Well developed, well nourished, in no acute distress. Psychiatric: Affect and mood are appropriate. Integumentary: No rashes or abrasions noted on exposed areas. Cardiovascular/Peripheral Vascular: +2 radial & pedal pulses. No peripheral edema is noted. Lymphatic:  No evidence of lymphedema. No cervical lymphadenopathy. SPINE/MUSCULOSKELETAL EXAM    Cervical spine:  Neck is midline. Normal muscle tone. No focal atrophy is noted. Neck ROM decreased and discomfort with turning left. Shoulder ROM intact. Tenderness to palpation Left Trapezius. Negative Spurling's sign. Negative Tinel's sign. Negative Srivastava's sign. Sensation grossly intact to light touch. MOTOR:     Biceps Triceps Deltoids Wrist Ext Wrist Flex Hand Intrin   Right 5/5 5/5 5/5 5/5 5/5 5/5   Left 5/5 5/5 5/5 5/5 5/5 5/5        Ambulation without assistive device. FWB.    normal gait and station        PAST MEDICAL HISTORY   History reviewed. No pertinent past medical history.     Past Surgical History:   Procedure Laterality Date    HX BREAST LUMPECTOMY Left 1973    benign tumor removed x3    HX BREAST LUMPECTOMY Right 1979    beingn tumor removed    HX ORTHOPAEDIC Right 1980    benign tumor removed    HX TUBAL LIGATION  1982   . MEDICATIONS      Current Outpatient Prescriptions   Medication Sig Dispense Refill    diclofenac EC (VOLTAREN) 75 mg EC tablet Take 1 Tab by mouth two (2) times daily as needed. With meals 60 Tab 5    cyclobenzaprine (FLEXERIL) 10 mg tablet Take 1 Tab by mouth nightly as needed for Muscle Spasm(s). 30 Tab 5    gabapentin (NEURONTIN) 300 mg capsule TAKE 1 CAPSULE (300 MG) BY ORAL ROUTE 2 TIMES PER DAY  2    lisinopril-hydroCHLOROthiazide (PRINZIDE, ZESTORETIC) 20-12.5 mg per tablet TAKE ONE TABLET BY MOUTH ONCE A DAY  4    pantoprazole (PROTONIX) 40 mg tablet TAKE ONE TABLET BY MOUTH ONCE A DAY  4    simvastatin (ZOCOR) 40 mg tablet TAKE ONE TABLET BY MOUTH TABLET EVERY DAY IN THE EVENING  1        ALLERGIES  No Known Allergies       SOCIAL HISTORY    Social History     Social History    Marital status: UNKNOWN     Spouse name: N/A    Number of children: N/A    Years of education: N/A     Occupational History    Not on file.      Social History Main Topics    Smoking status: Current Every Day Smoker     Packs/day: 1.00     Years: 25.00     Types: Cigarettes    Smokeless tobacco: Never Used    Alcohol use No    Drug use: No    Sexual activity: Yes     Partners: Male     Other Topics Concern    Not on file     Social History Narrative     Social History Narrative      Problem Relation Age of Onset    Hypertension Mother     Kidney Disease Mother     Hypertension Father     Heart Disease Father          Andrea Sultana NP

## 2018-01-15 NOTE — PATIENT INSTRUCTIONS
Neck Arthritis: Exercises  Your Care Instructions  Here are some examples of typical rehabilitation exercises for your condition. Start each exercise slowly. Ease off the exercise if you start to have pain. Your doctor or physical therapist will tell you when you can start these exercises and which ones will work best for you. How to do the exercises  Neck stretches to the side    1. This stretch works best if you keep your shoulder down as you lean away from it. To help you remember to do this, start by relaxing your shoulders and lightly holding on to your thighs or your chair. 2. Tilt your head toward your shoulder and hold for 15 to 30 seconds. Let the weight of your head stretch your muscles. 3. Repeat 2 to 4 times toward each shoulder. Chin tuck    1. Lie on the floor with a rolled-up towel under your neck. Your head should be touching the floor. 2. Slowly bring your chin toward your chest.  3. Hold for a count of 6, and then relax for up to 10 seconds. 4. Repeat 8 to 12 times. Active cervical rotation    1. Sit in a firm chair, or stand up straight. 2. Keeping your chin level, turn your head to the right, and hold for 15 to 30 seconds. 3. Turn your head to the left and hold for 15 to 30 seconds. 4. Repeat 2 to 4 times to each side. Shoulder blade squeeze    1. While standing, squeeze your shoulder blades together. 2. Do not raise your shoulders up as you are squeezing. 3. Hold for 6 seconds. 4. Repeat 8 to 12 times. Shoulder rolls    1. Sit comfortably with your feet shoulder-width apart. You can also do this exercise standing up. 2. Roll your shoulders up, then back, and then down in a smooth, circular motion. 3. Repeat 2 to 4 times. Follow-up care is a key part of your treatment and safety. Be sure to make and go to all appointments, and call your doctor if you are having problems. It's also a good idea to know your test results and keep a list of the medicines you take.   Where can you learn more? Go to http://steven-lewis.info/. Enter Z922 in the search box to learn more about \"Neck Arthritis: Exercises. \"  Current as of: March 21, 2017  Content Version: 11.4  © 5497-5026 [employer], [employer]. Care instructions adapted under license by [employer] (which disclaims liability or warranty for this information). If you have questions about a medical condition or this instruction, always ask your healthcare professional. [place] [address] any warranty or liability for your use of this information.

## 2018-07-11 ENCOUNTER — OFFICE VISIT (OUTPATIENT)
Dept: ORTHOPEDIC SURGERY | Age: 58
End: 2018-07-11

## 2018-07-11 VITALS
OXYGEN SATURATION: 98 % | WEIGHT: 182 LBS | HEIGHT: 66 IN | HEART RATE: 88 BPM | RESPIRATION RATE: 17 BRPM | BODY MASS INDEX: 29.25 KG/M2 | TEMPERATURE: 98.8 F | SYSTOLIC BLOOD PRESSURE: 118 MMHG | DIASTOLIC BLOOD PRESSURE: 69 MMHG

## 2018-07-11 DIAGNOSIS — M50.30 DDD (DEGENERATIVE DISC DISEASE), CERVICAL: Primary | ICD-10-CM

## 2018-07-11 DIAGNOSIS — G62.9 NEUROPATHY: ICD-10-CM

## 2018-07-11 DIAGNOSIS — R26.89 STUMBLING GAIT: ICD-10-CM

## 2018-07-11 NOTE — PATIENT INSTRUCTIONS
Healthy Upper Back: Exercises  Your Care Instructions  Here are some examples of exercises for your upper back. Start each exercise slowly. Ease off the exercise if you start to have pain. Your doctor or physical therapist will tell you when you can start these exercises and which ones will work best for you. How to do the exercises  Lower neck and upper back stretch    1. Stretch your arms out in front of your body. Clasp one hand on top of your other hand. 2. Gently reach out so that you feel your shoulder blades stretching away from each other. 3. Gently bend your head forward. 4. Hold for 15 to 30 seconds. 5. Repeat 2 to 4 times. Midback stretch    If you have knee pain, do not do this exercise. 1. Kneel on the floor, and sit back on your ankles. 2. Lean forward, place your hands on the floor, and stretch your arms out in front of you. Rest your head between your arms. 3. Gently push your chest toward the floor, reaching as far in front of you as possible. 4. Hold for 15 to 30 seconds. 5. Repeat 2 to 4 times. Shoulder rolls    1. Sit comfortably with your feet shoulder-width apart. You can also do this exercise while standing. 2. Roll your shoulders up, then back, and then down in a smooth, circular motion. 3. Repeat 2 to 4 times. Wall push-up    1. Stand against a wall with your feet about 12 to 24 inches back from the wall. If you feel any pain when you do this exercise, stand closer to the wall. 2. Place your hands on the wall slightly wider apart than your shoulders, and lean forward. 3. Gently lean your body toward the wall. Then push back to your starting position. Keep the motion smooth and controlled. 4. Repeat 8 to 12 times. Resisted shoulder blade squeeze    For this exercise, you will need elastic exercise material, such as surgical tubing or Thera-Band. 1. Sit or stand, holding the band in both hands in front of you.  Keep your elbows close to your sides, bent at a 90-degree angle. Your palms should face up. 2. Squeeze your shoulder blades together, and move your arms to the outside, stretching the band. Be sure to keep your elbows at your sides while you do this. 3. Relax. 4. Repeat 8 to 12 times. Resisted rows    For this exercise, you will need elastic exercise material, such as surgical tubing or Thera-Band. 1. Put the band around a solid object, such as a bedpost, at about waist level. Hold one end of the band in each hand. 2. With your elbows at your sides and bent to 90 degrees, pull the band back to move your shoulder blades toward each other. Return to the starting position. 3. Repeat 8 to 12 times. Follow-up care is a key part of your treatment and safety. Be sure to make and go to all appointments, and call your doctor if you are having problems. It's also a good idea to know your test results and keep a list of the medicines you take. Where can you learn more? Go to http://steven-lewis.info/. Enter S078 in the search box to learn more about \"Healthy Upper Back: Exercises. \"  Current as of: November 29, 2017  Content Version: 11.7  © 4975-3203 [employer]. Care instructions adapted under license by [employer] (which disclaims liability or warranty for this information). If you have questions about a medical condition or this instruction, always ask your healthcare professional. [place] [address] any warranty or liability for your use of this information. Preventing Falls: Care Instructions  Your Care Instructions    Getting around your home safely can be a challenge if you have injuries or health problems that make it easy for you to fall. Loose rugs and furniture in walkways are among the dangers for many older people who have problems walking or who have poor eyesight. People who have conditions such as arthritis, osteoporosis, or dementia also have to be careful not to fall.   You can make your home safer with a few simple measures. Follow-up care is a key part of your treatment and safety. Be sure to make and go to all appointments, and call your doctor if you are having problems. It's also a good idea to know your test results and keep a list of the medicines you take. How can you care for yourself at home? Taking care of yourself  · You may get dizzy if you do not drink enough water. To prevent dehydration, drink plenty of fluids, enough so that your urine is light yellow or clear like water. Choose water and other caffeine-free clear liquids. If you have kidney, heart, or liver disease and have to limit fluids, talk with your doctor before you increase the amount of fluids you drink. · Exercise regularly to improve your strength, muscle tone, and balance. Walk if you can. Swimming may be a good choice if you cannot walk easily. · Have your vision and hearing checked each year or any time you notice a change. If you have trouble seeing and hearing, you might not be able to avoid objects and could lose your balance. · Know the side effects of the medicines you take. Ask your doctor or pharmacist whether the medicines you take can affect your balance. Sleeping pills or sedatives can affect your balance. · Limit the amount of alcohol you drink. Alcohol can impair your balance and other senses. · Ask your doctor whether calluses or corns on your feet need to be removed. If you wear loose-fitting shoes because of calluses or corns, you can lose your balance and fall. · Talk to your doctor if you have numbness in your feet. Preventing falls at home  · Remove raised doorway thresholds, throw rugs, and clutter. Repair loose carpet or raised areas in the floor. · Move furniture and electrical cords to keep them out of walking paths. · Use nonskid floor wax, and wipe up spills right away, especially on ceramic tile floors. · If you use a walker or cane, put rubber tips on it.  If you use crutches, clean the bottoms of them regularly with an abrasive pad, such as steel wool. · Keep your house well lit, especially [place], and outside walkways. Use night-lights in areas such as hallways and bathrooms. Add extra light switches or use remote switches (such as switches that go on or off when you clap your hands) to make it easier to turn lights on if you have to get up during the night. · Install sturdy handrails on stairways. · Move items in your cabinets so that the things you use a lot are on the lower shelves (about waist level). · Keep a cordless phone and a flashlight with new batteries by your bed. If possible, put a phone in each of the main rooms of your house, or carry a cell phone in case you fall and cannot reach a phone. Or, you can wear a device around your neck or wrist. You push a button that sends a signal for help. · Wear low-heeled shoes that fit well and give your feet good support. Use footwear with nonskid soles. Check the heels and soles of your shoes for wear. Repair or replace worn heels or soles. · Do not wear socks without shoes on wood floors. · Walk on the grass when the sidewalks are slippery. If you live in an area that gets snow and ice in the winter, sprinkle salt on slippery steps and sidewalks. Preventing falls in the bath  · Install grab bars and nonskid mats inside and outside your shower or tub and near the toilet and sinks. · Use shower chairs and bath benches. · Use a hand-held shower head that will allow you to sit while showering. · Get into a tub or shower by putting the weaker leg in first. Get out of a tub or shower with your strong side first.  · Repair loose toilet seats and consider installing a raised toilet seat to make getting on and off the toilet easier. · Keep your bathroom door unlocked while you are in the shower. Where can you learn more? Go to http://steven-lewis.info/.   Enter [id number] in the search box to learn more about \"Preventing Falls: Care Instructions. \"  Current as of: May 12, 2017  Content Version: 11.7  © 0885-3144 [employer]. Care instructions adapted under license by [employer] (which disclaims liability or warranty for this information). If you have questions about a medical condition or this instruction, always ask your healthcare professional. [address] any warranty or liability for your use of this information. Preventing Outdoor Falls: Care Instructions  Your Care Instructions    Worries about falls don't need to keep you indoors. Outdoor activities like walking have big benefits for your health. You will need to watch your step and learn a few safety measures. If you are worried about having a fall outdoors, ask your doctor about exercises, classes, or physical therapy that may help. You can learn ways to gain strength, flexibility, and balance. Ask if it might help to use a cane or walker. You can make your time outdoors safer with a few simple measures. Follow-up care is a key part of your treatment and safety. Be sure to make and go to all appointments, and call your doctor if you are having problems. It's also a good idea to know your test results and keep a list of the medicines you take. How can you prevent falls outdoors? · Wear shoes with firm soles and low heels. If you have to walk on an icy surface, use grippers that can be worn over your shoes in bad weather. · Be extra careful if weather is bad. Walk on the grass when the sidewalks are slick. If you live in a place that gets snow and ice in the winter, sprinkle salt on slippery stairs and sidewalks. · Be careful getting on or off buses and trains or getting in and out of cars. If handrails are available, use them. · Be careful when you cross the street. Look for crosswalks or places where curb cuts or ramps are present.   · Try not to hurry, especially if you are carrying something. · Be cautious in parking lots or garages. There may be curbs or changes in pavement, or the height of the pavement may vary. · Make sure to wear the correct eyeglasses, if you need them. Reading glasses or bifocals can make it harder to see hazards that might be in your way. · If you are walking outdoors for exercise, try to:  ¨ Walk in well-lighted, well-maintained areas. These include high school or college tracks, shopping malls, and public spaces. ¨ Walk with a partner. ¨ Watch out for cracked sidewalks, curbs, changes in the height of the pavement, exposed tree roots, and debris such as fallen leaves or branches. Where can you learn more? Go to http://steven-lewis.info/. Enter L101 in the search box to learn more about \"Preventing Outdoor Falls: Care Instructions. \"  Current as of: May 12, 2017  Content Version: 11.7  © 5706-9699 [employer]. Care instructions adapted under license by [employer] (which disclaims liability or warranty for this information). If you have questions about a medical condition or this instruction, always ask your healthcare professional. [address] any warranty or liability for your use of this information.

## 2018-07-11 NOTE — PROGRESS NOTES
Verbal order entered per Dr. Eboni Tamez as documented on blue sheet:  -neurology referral, progressive stumbling, numbness in feet

## 2018-07-11 NOTE — PROGRESS NOTES
[facility] May[facility] [address]  [address], [address]  [place], [facility] [address]  Phone: [phone]  Fax: [phone]        Melina Palafox  : 1960  PCP: Cindy Cornejo MD    PROGRESS NOTE      ASSESSMENT AND PLAN    Diagnoses and all orders for this visit:    1. DDD (degenerative disc disease), cervical    2. Stumbling gait  -     REFERRAL TO NEUROLOGY    3. Neuropathy  -     REFERRAL TO NEUROLOGY       1. Advised to continue HEP. 2. Given information on upper back exercises and fall prevention  3. Referral to Neurology for progressive stumbling  4. Released from specialty care to PCP. May have future fills through PCP if agreeable. 5. Discussed decreasing Voltaren to prn and risks of long term use. Cont PRN Flexeril      Follow-up Disposition:  Return if symptoms worsen or fail to improve. HISTORY OF PRESENT ILLNESS  Yury Piper is a 63 y.o. female. Pt presents to the office for a f/u visit for neck pain. Doing better. Pt complains of upper back pain. She states this is a left sided spasm. This began recently. Pt reports pain does not radiate. Pt denies weakness in BUE. Pt denies paresthesia of BUE. Pt completed physical therapy successfully. Pt states she continues to do HEP as she is able. Pt reports tripping a lot at work and foot paresthesias. She states that Dr. Bingham Gip previously dx'ed her with neuropathy. Pt denies saddle paresthesias. Pt states she has been using Gabapentin with relief for neuropathy. Pt takes Flexeril PRN at night with relief. No am somnolence. Pt takes Voltaren routinely qday with relief. Pt denies any recent GI ulcers, bleeds or renal dysfucntion. Pt denies recent ED visits or hospitalizations. Denies persistent fevers, chills, weight changes, neurogenic bowel or bladder symptoms.  reviewed. PMHx thyroid removed.     Pain Assessment  2018   Location of Pain -   Severity of Pain 0   Quality of Pain -   Duration of Pain -   Frequency of Pain -   Relieving Factors -   Relieving Factors Comment -   Result of Injury -       PAST MEDICAL HISTORY   No past medical history on file. Past Surgical History:   Procedure Laterality Date    HX BREAST LUMPECTOMY Left 1973    benign tumor removed x3    HX BREAST LUMPECTOMY Right 1979    beingn tumor removed    HX ORTHOPAEDIC Right 1980    benign tumor removed    HX TUBAL LIGATION  1982   . MEDICATIONS      Current Outpatient Prescriptions   Medication Sig Dispense Refill    cyclobenzaprine (FLEXERIL) 10 mg tablet TAKE 1 TAB BY MOUTH NIGHTLY AS NEEDED FOR MUSCLE SPASM(S). 30 Tab 0    diclofenac EC (VOLTAREN) 75 mg EC tablet TAKE 1 TAB BY MOUTH TWO (2) TIMES DAILY AS NEEDED. WITH MEALS 60 Tab 0    gabapentin (NEURONTIN) 300 mg capsule TAKE 1 CAPSULE (300 MG) BY ORAL ROUTE 2 TIMES PER DAY  2    lisinopril-hydroCHLOROthiazide (PRINZIDE, ZESTORETIC) 20-12.5 mg per tablet TAKE ONE TABLET BY MOUTH ONCE A DAY  4    pantoprazole (PROTONIX) 40 mg tablet TAKE ONE TABLET BY MOUTH ONCE A DAY  4    simvastatin (ZOCOR) 40 mg tablet TAKE ONE TABLET BY MOUTH TABLET EVERY DAY IN THE EVENING  1        ALLERGIES  No Known Allergies       SOCIAL HISTORY    Social History     Social History    Marital status: UNKNOWN     Spouse name: N/A    Number of children: N/A    Years of education: N/A     Occupational History    Not on file.      Social History Main Topics    Smoking status: Current Every Day Smoker     Packs/day: 1.00     Years: 25.00     Types: Cigarettes    Smokeless tobacco: Never Used    Alcohol use No    Drug use: No    Sexual activity: Yes     Partners: Male     Other Topics Concern    Not on file     Social History Narrative       FAMILY HISTORY    Family History   Problem Relation Age of Onset    Hypertension Mother     Kidney Disease Mother     Hypertension Father     Heart Disease Father        REVIEW OF SYSTEMS  Review of Systems   Constitutional: Negative for chills, fever and weight loss. Respiratory: Negative for shortness of breath. Cardiovascular: Negative for chest pain. Gastrointestinal: Negative for constipation. Negative for fecal incontinence   Genitourinary: Negative for dysuria. Negative for urinary incontinence   Musculoskeletal: Positive for falls. Per HPI   Skin: Negative for rash. Neurological: Positive for tingling. Negative for dizziness, tremors, focal weakness and headaches. Endo/Heme/Allergies: Does not bruise/bleed easily. Psychiatric/Behavioral: The patient does not have insomnia. PHYSICAL EXAMINATION  Visit Vitals    /69    Pulse 88    Temp 98.8 °F (37.1 °C) (Oral)    Resp 17    Ht 5' 6\" (1.676 m)    Wt 182 lb (82.6 kg)    SpO2 98%    BMI 29.38 kg/m2         Accompanied by self. Constitutional:  Well developed, well nourished, in no acute distress. Psychiatric: Affect and mood are appropriate. Integumentary: No rashes or abrasions noted on exposed areas. Cardiovascular/Peripheral Vascular: Intact l pulses. No peripheral edema is noted. Lymphatic:  No evidence of lymphedema. No cervical lymphadenopathy. SPINE/MUSCULOSKELETAL EXAM    Cervical spine:  Neck is midline. Increased muscle tone L trapezius. No focal atrophy is noted. Mild crepitius with L shoulder ROM. Full shoulder ROM. Tenderness to palpation L medial scapula. Negative Spurling's sign. Negative Tinel's sign. Negative Srivastava's sign. Negative drop arm. MOTOR:      Biceps  Triceps Deltoids Wrist Ext Wrist Flex Hand Intrin   Right +4/5 +4/5 +4/5 +4/5 +4/5 +4/5   Left +4/5 +4/5 +4/5 +4/5 +4/5 +4/5       DTRs are 1+ biceps, triceps, brachioradialis,   DTRs 2+ brisk patella, and Achilles. Mild difficulty with tandem gait. Ambulation without assistive device. FWB.       Written by Julio C Mora, as dictated by Tina Martinez MD.    I, Dr. Tina Martinez MD, confirm that all documentation is accurate. Ms. Salvatore García may have a reminder for a \"due or due soon\" health maintenance. I have asked that she contact her primary care provider for follow-up on this health maintenance.

## 2018-07-23 DIAGNOSIS — M47.812 CERVICAL FACET JOINT SYNDROME: ICD-10-CM

## 2018-07-23 RX ORDER — DICLOFENAC SODIUM 75 MG/1
75 TABLET, DELAYED RELEASE ORAL
Qty: 30 TAB | Refills: 0 | Status: SHIPPED | OUTPATIENT
Start: 2018-07-23 | End: 2022-10-25

## 2020-07-15 ENCOUNTER — HOSPITAL ENCOUNTER (OUTPATIENT)
Dept: CT IMAGING | Age: 60
Discharge: HOME | End: 2020-07-15
Attending: INTERNAL MEDICINE
Payer: COMMERCIAL

## 2020-07-15 DIAGNOSIS — D35.00 BENIGN NEOPLASM OF ADRENAL GLAND: ICD-10-CM

## 2020-07-15 DIAGNOSIS — R91.1 COIN LESION: ICD-10-CM

## 2020-07-15 DIAGNOSIS — D35.00 BENIGN NEOPLASM OF UNSPECIFIED ADRENAL GLAND: ICD-10-CM

## 2020-07-15 LAB — CREAT UR-MCNC: 0.7 MG/DL (ref 0.6–1.3)

## 2020-07-15 PROCEDURE — 82565 ASSAY OF CREATININE: CPT

## 2020-07-15 PROCEDURE — 74011636320 HC RX REV CODE- 636/320: Performed by: INTERNAL MEDICINE

## 2020-07-15 PROCEDURE — 74177 CT ABD & PELVIS W/CONTRAST: CPT

## 2020-07-15 RX ADMIN — IOPAMIDOL 100 ML: 612 INJECTION, SOLUTION INTRAVENOUS at 15:43

## 2020-07-29 ENCOUNTER — HOSPITAL ENCOUNTER (OUTPATIENT)
Dept: ULTRASOUND IMAGING | Age: 60
Discharge: HOME | End: 2020-07-29
Attending: INTERNAL MEDICINE
Payer: COMMERCIAL

## 2020-07-29 DIAGNOSIS — R91.1 PULMONARY NODULE: ICD-10-CM

## 2020-07-29 DIAGNOSIS — E04.1 NONTOXIC UNINODULAR GOITER: ICD-10-CM

## 2020-07-29 DIAGNOSIS — D35.00 BENIGN NEOPLASM OF ADRENAL GLAND: ICD-10-CM

## 2020-07-29 PROCEDURE — 76536 US EXAM OF HEAD AND NECK: CPT

## 2020-11-13 ENCOUNTER — OFFICE VISIT (OUTPATIENT)
Dept: ORTHOPEDIC SURGERY | Age: 60
End: 2020-11-13
Payer: COMMERCIAL

## 2020-11-13 VITALS
WEIGHT: 184 LBS | HEART RATE: 83 BPM | OXYGEN SATURATION: 100 % | TEMPERATURE: 97.2 F | BODY MASS INDEX: 29.57 KG/M2 | DIASTOLIC BLOOD PRESSURE: 82 MMHG | RESPIRATION RATE: 22 BRPM | HEIGHT: 66 IN | SYSTOLIC BLOOD PRESSURE: 128 MMHG

## 2020-11-13 DIAGNOSIS — M54.42 CHRONIC BILATERAL LOW BACK PAIN WITH BILATERAL SCIATICA: ICD-10-CM

## 2020-11-13 DIAGNOSIS — M54.41 CHRONIC BILATERAL LOW BACK PAIN WITH BILATERAL SCIATICA: ICD-10-CM

## 2020-11-13 DIAGNOSIS — M54.2 NECK PAIN: ICD-10-CM

## 2020-11-13 DIAGNOSIS — M47.812 CERVICAL FACET JOINT SYNDROME: Primary | ICD-10-CM

## 2020-11-13 DIAGNOSIS — G89.29 CHRONIC BILATERAL LOW BACK PAIN WITH BILATERAL SCIATICA: ICD-10-CM

## 2020-11-13 PROCEDURE — 99213 OFFICE O/P EST LOW 20 MIN: CPT | Performed by: NURSE PRACTITIONER

## 2020-11-13 PROCEDURE — 72040 X-RAY EXAM NECK SPINE 2-3 VW: CPT | Performed by: NURSE PRACTITIONER

## 2020-11-13 RX ORDER — ATORVASTATIN CALCIUM 40 MG/1
TABLET, FILM COATED ORAL
COMMUNITY
Start: 2020-10-19 | End: 2022-10-25

## 2020-11-13 RX ORDER — PREDNISONE 10 MG/1
10 TABLET ORAL
COMMUNITY
Start: 2020-11-11 | End: 2021-03-25

## 2020-11-13 RX ORDER — BUPROPION HYDROCHLORIDE 150 MG/1
150 TABLET ORAL DAILY
COMMUNITY
Start: 2020-11-11 | End: 2021-03-25

## 2020-11-13 NOTE — PROGRESS NOTES
Luann Limon presents today for   Chief Complaint   Patient presents with    Neck Pain       Is someone accompanying this pt? no    Is the patient using any DME equipment during OV? no      Coordination of Care:  1. Have you been to the ER, urgent care clinic since your last visit? no  Hospitalized since your last visit? no    2. Have you seen or consulted any other health care providers outside of the [address] [place] [address] since your last visit? Yes, pcp Include any pap smears or colon screening.  no

## 2020-11-13 NOTE — PATIENT INSTRUCTIONS
Neck Spasm: Exercises  Introduction  Here are some examples of exercises for you to try. The exercises may be suggested for a condition or for rehabilitation. Start each exercise slowly. Ease off the exercises if you start to have pain. You will be told when to start these exercises and which ones will work best for you. How to do the exercises  Levator scapula stretch   1. Sit in a firm chair, or stand up straight. 2. Gently tilt your head toward your left shoulder. 3. Turn your head to look down into your armpit, bending your head slightly forward. Let the weight of your head stretch your neck muscles. 4. Hold for 15 to 30 seconds. 5. Return to your starting position. 6. Follow the same instructions above, but tilt your head toward your right shoulder. 7. Repeat 2 to 4 times toward each shoulder. Upper trapezius stretch   1. Sit in a firm chair, or stand up straight. 2. This stretch works best if you keep your shoulder down as you lean away from it. To help you remember to do this, start by relaxing your shoulders and lightly holding on to your thighs or your chair. 3. Tilt your head toward your shoulder and hold for 15 to 30 seconds. Let the weight of your head stretch your muscles. 4. If you would like a little added stretch, place your arm behind your back. Use the arm opposite of the direction you are tilting your head. For example, if you are tilting your head to the left, place your right arm behind your back. 5. Repeat 2 to 4 times toward each shoulder. Neck rotation   1. Sit in a firm chair, or stand up straight. 2. Keeping your chin level, turn your head to the right, and hold for 15 to 30 seconds. 3. Turn your head to the left, and hold for 15 to 30 seconds. 4. Repeat 2 to 4 times to each side. Chin tuck   1. Lie on the floor with a rolled-up towel under your neck. Your head should be touching the floor. 2. Slowly bring your chin toward the front of your neck.   3. Hold for a count of 6, and then relax for up to 10 seconds. 4. Repeat 8 to 12 times. Forward neck flexion   1. Sit in a firm chair, or stand up straight. 2. Bend your head forward. 3. Hold for 15 to 30 seconds, then return to your starting position. 4. Repeat 2 to 4 times. Follow-up care is a key part of your treatment and safety. Be sure to make and go to all appointments, and call your doctor if you are having problems. It's also a good idea to know your test results and keep a list of the medicines you take. Where can you learn more? Go to http://www.gray.com/  Enter P962 in the search box to learn more about \"Neck Spasm: Exercises. \"  Current as of: March 2, 2020               Content Version: 12.6  © 1871-1222 [employer], Incorporated. Care instructions adapted under license by [employer] (which disclaims liability or warranty for this information). If you have questions about a medical condition or this instruction, always ask your healthcare professional. [place] [address] any warranty or liability for your use of this information. Back Stretches: Exercises  Introduction  Here are some examples of exercises for stretching your back. Start each exercise slowly. Ease off the exercise if you start to have pain. Your doctor or physical therapist will tell you when you can start these exercises and which ones will work best for you. How to do the exercises  Overhead stretch   1. Stand comfortably with your feet shoulder-width apart. 2. Looking straight ahead, raise both arms over your head and reach toward the ceiling. Do not allow your head to tilt back. 3. Hold for 15 to 30 seconds, then lower your arms to your sides. 4. Repeat 2 to 4 times. Side stretch   1. Stand comfortably with your feet shoulder-width apart. 2. Raise one arm over your head, and then lean to the other side.   3. Slide your hand down your leg as you let the weight of your arm gently stretch your side muscles. Hold for 15 to 30 seconds. 4. Repeat 2 to 4 times on each side. Press-up   1. Lie on your stomach, supporting your body with your forearms. 2. Press your elbows down into the floor to raise your upper back. As you do this, relax your stomach muscles and allow your back to arch without using your back muscles. As your press up, do not let your hips or pelvis come off the floor. 3. Hold for 15 to 30 seconds, then relax. 4. Repeat 2 to 4 times. Relax and rest   1. Lie on your back with a rolled towel under your neck and a pillow under your knees. Extend your arms comfortably to your sides. 2. Relax and breathe normally. 3. Remain in this position for about 10 minutes. 4. If you can, do this 2 or 3 times each day. Follow-up care is a key part of your treatment and safety. Be sure to make and go to all appointments, and call your doctor if you are having problems. It's also a good idea to know your test results and keep a list of the medicines you take. Where can you learn more? Go to http://www.berry.com/  Enter Y090 in the search box to learn more about \"Back Stretches: Exercises. \"  Current as of: March 2, 2020               Content Version: 12.6  © 9520-9968 [employer]. Care instructions adapted under license by [employer] (which disclaims liability or warranty for this information). If you have questions about a medical condition or this instruction, always ask your healthcare professional. [address] any warranty or liability for your use of this information.

## 2020-11-13 NOTE — PROGRESS NOTES
[facility]  [address]  [place], [facility]  Phone: [phone]  Fax: [phone]  PROGRESS NOTE  Patient: Julián Groves                MRN: [id number]       SSN: [id number]  YOB: 1960        AGE: 62 y.o. SEX: female  Body mass index is 29.7 kg/m². PCP: Sina Galindo MD  11/13/20    Chief Complaint   Patient presents with    Neck Pain       HISTORY OF PRESENT ILLNESS:  Julián Groves is a 62 y.o.  female with history of neck pain and L shoulder pain that started a wk ago w/out any injuries and without any radicular pain. She has had chronic neck pain off and on for years now. She is neuro intact. She saw us a few yrs ago and was to f/o PRN. She also has had some chronic Low back pain w/ R>L sciatica in the L5-S1 distribution. She has good strength on exam, her sciatica only bothers her w/ extended periods of sitting. Denies bladder/bowel dysfunction, saddle paresthesia, weakness, gait disturbance, or other neurological deficits. Current Medications: Diclofenac 75mg daily, Flexeril 10mg QHS, Gabapentin 300mg daily to BID. Recently given prednisone by PCP. SocHx: smoker-1 pack a day, manager for house cleaning    ASSESSMENT   Diagnoses and all orders for this visit:    1. Cervical facet joint syndrome  -     AMB POC XRAY, SPINE, CERVICAL; 2 OR 3    2. Neck pain  -     AMB POC XRAY, SPINE, CERVICAL; 2 OR 3    3. Chronic bilateral low back pain with bilateral sciatica         IMPRESSION AND PLAN:      > Pt was given information on NECK and back stretches  > C x-rays today show degenerative changes w/ stable listhesis, final interpretation for Dr. Kaur White   > Recommended increasing Gabapentin to 300/600  > Discussed lifestyle modifications, smoking cessation and wgt loss  > Ms. Rafael Serrano has a reminder for a \"due or due soon\" health maintenance.  I have asked that she contact her primary care provider, Antony Dos Santos MD [facility], for follow-up on this health maintenance. >\" We have informed patient to notify us for immediate appointment if he has any worsening neurogical symptoms or if an emergency situation presents, then call 911  >  has been reviewed and is appropriate  > Pt will follow-up in PRN . Subjective    Pain Scale: 5/10    Pain Assessment  11/13/2020   Location of Pain Neck   Severity of Pain 5   Quality of Pain Burning   Duration of Pain Persistent   Frequency of Pain Intermittent   Aggravating Factors Other (Comment)   Aggravating Factors Comment anything I do   Limiting Behavior Yes   Relieving Factors NSAID;Other (Comment)   Relieving Factors Comment muscle relaxers   Result of Injury No         REVIEW OF SYSTEMS  Constitutional: Negative for fever, chills, or weight change. Respiratory: Negative for cough or shortness of breath. Cardiovascular: Negative for chest pain or palpitations. Gastrointestinal: Negative for incontinence, acid reflux, change in bowel habits, or constipation. Genitourinary: Negative for incontinence, dysuria and flank pain. Musculoskeletal: Positive for Neck, L shoulder, back and BLE pain. See HPI. Skin: Negative for rash. Neurological:BLE L5-S1  radiculopathy. See HPI. Endo/Heme/Allergies: Negative. Psychiatric/Behavioral: Negative. PHYSICAL EXAMINATION  Visit Vitals  /82 (BP 1 Location: Right arm, BP Patient Position: Sitting)   Pulse 83   Temp 97.2 °F (36.2 °C) (Skin)   Resp 22   Ht 5' 6\" (1.676 m)   Wt 184 lb (83.5 kg)   SpO2 100% Comment: RA   BMI 29.70 kg/m²         Accompanied by Self. Constitutional:  Well developed, well nourished, in no acute distress. Psychiatric: Affect and mood are appropriate. Integumentary: No rashes or abrasions noted on exposed areas. Cardiovascular/Peripheral Vascular: +2 radial & pedal pulses. No peripheral edema is noted. Lymphatic:  No evidence of lymphedema. No cervical lymphadenopathy.      SPINE/MUSCULOSKELETAL EXAM    Cervical spine:  Neck is midline. Normal muscle tone. No focal atrophy is noted. Neck ROM decreased with flexion, extension, turning right, turning left. Shoulder ROM intact. Tenderness to palpation L trap w/ muscle tension. Negative Spurling's sign. Negative Tinel's sign. Negative Srivastava's sign. Sensation grossly intact to light touch. Lumbar spine:  No rash, ecchymosis, or gross obliquity. No fasciculations. No focal atrophy is noted. Range of motion is decreased with flexion, extension, turning right, turning left. Tenderness to palpation bilateral low back. No tenderness to palpation at the sciatic notch. SI joints non-tender. Trochanters non tender. Straight leg raise neg  Hip Impingement neg    Sensation grossly intact to light touch. MOTOR:        Biceps  Triceps Deltoids Wrist Ext Wrist Flex Hand Intrin   Right +4/5 +4/5 +4/5 +4/5 +4/5 +4/5   Left +4/5 +4/5 +4/5 +4/5 +4/5 +4/5      Hip Flex Quads Hamstrings Ankle DF EHL Ankle PF   Right +4/5 +4/5 +4/5 +4/5 +4/5 +4/5   Left +4/5 +4/5 +4/5 +4/5 +4/5 +4/5           DTRs are 1+ biceps, triceps, brachioradialis, patella, and Achilles. Ambulation without assistive device. FWB.    normal gait and station        PAST MEDICAL HISTORY   History reviewed. No pertinent past medical history. Past Surgical History:   Procedure Laterality Date    HX BREAST LUMPECTOMY Left 1973    benign tumor removed x3    HX BREAST LUMPECTOMY Right 1979    beingn tumor removed    HX ORTHOPAEDIC Right 1980    benign tumor removed    HX TUBAL LIGATION  1982   . MEDICATIONS      Current Outpatient Medications   Medication Sig Dispense Refill    atorvastatin (LIPITOR) 40 mg tablet TAKE 1 TABLET BY MOUTH EVERY EVENING      buPROPion XL (WELLBUTRIN XL) 150 mg tablet Take 150 mg by mouth daily.  predniSONE (DELTASONE) 10 mg tablet Take 10 mg by mouth. Dose pack      diclofenac EC (VOLTAREN) 75 mg EC tablet Take 1 Tab by mouth daily as needed. 30 Tab 0    cyclobenzaprine (FLEXERIL) 10 mg tablet TAKE 1 TAB BY MOUTH NIGHTLY AS NEEDED FOR MUSCLE SPASM(S).  30 Tab 0    gabapentin (NEURONTIN) 300 mg capsule TAKE 1 CAPSULE (300 MG) BY ORAL ROUTE 2 TIMES PER DAY  2    lisinopril-hydroCHLOROthiazide (PRINZIDE, ZESTORETIC) 20-12.5 mg per tablet TAKE ONE TABLET BY MOUTH ONCE A DAY  4    pantoprazole (PROTONIX) 40 mg tablet TAKE ONE TABLET BY MOUTH ONCE A DAY  4    simvastatin (ZOCOR) 40 mg tablet TAKE ONE TABLET BY MOUTH TABLET EVERY DAY IN THE EVENING  1        ALLERGIES  No Known Allergies       SOCIAL HISTORY    Social History     Socioeconomic History    Marital status:      Spouse name: Not on file    Number of children: Not on file    Years of education: Not on file    Highest education level: Not on file   Occupational History    Not on file   Social Needs    Financial resource strain: Not on file    Food insecurity     Worry: Not on file     Inability: Not on file    Transportation needs     Medical: Not on file     Non-medical: Not on file   Tobacco Use    Smoking status: Current Every Day Smoker     Packs/day: 1.00     Years: 25.00     Pack years: 25.00     Types: Cigarettes    Smokeless tobacco: Never Used   Substance and Sexual Activity    Alcohol use: No    Drug use: No    Sexual activity: Yes     Partners: Male   Lifestyle    Physical activity     Days per week: Not on file     Minutes per session: Not on file    Stress: Not on file   Relationships    Social connections     Talks on phone: Not on file     Gets together: Not on file     Attends Quaker service: Not on file     Active member of club or organization: Not on file     Attends meetings of clubs or organizations: Not on file     Relationship status: Not on file    Intimate partner violence     Fear of current or ex partner: Not on file     Emotionally abused: Not on file     Physically abused: Not on file     Forced sexual activity: Not on file   Other Topics Concern    Not on file   Social History Narrative    Not on file     Socioeconomic History    Marital status:      Spouse name: Not on file    Number of children: Not on file    Years of education: Not on file    Highest education level: Not on file   Occupational History    Not on file   Social Needs    Financial resource strain: Not on file    Food insecurity     Worry: Not on file     Inability: Not on file    Transportation needs     Medical: Not on file     Non-medical: Not on file   Tobacco Use    Smoking status: Current Every Day Smoker     Packs/day: 1.00     Years: 25.00     Pack years: 25.00     Types: Cigarettes    Smokeless tobacco: Never Used   Substance and Sexual Activity    Alcohol use: No    Drug use: No    Sexual activity: Yes     Partners: Male   Lifestyle    Physical activity     Days per week: Not on file     Minutes per session: Not on file    Stress: Not on file   Relationships    Social connections     Talks on phone: Not on file     Gets together: Not on file     Attends Congregation service: Not on file     Active member of club or organization: Not on file     Attends meetings of clubs or organizations: Not on file     Relationship status: Not on file    Intimate partner violence     Fear of current or ex partner: Not on file     Emotionally abused: Not on file     Physically abused: Not on file     Forced sexual activity: Not on file   Other Topics Concern    Not on file   Social History Narrative    Not on file      Problem Relation Age of Onset    Hypertension Mother     Kidney Disease Mother     Hypertension Father     Heart Disease Father          Mary Ellen Sharpe NP

## 2021-03-16 ENCOUNTER — TRANSCRIBE ORDER (OUTPATIENT)
Dept: SCHEDULING | Age: 61
End: 2021-03-16

## 2021-03-16 DIAGNOSIS — R91.1 PULMONARY NODULE: Primary | ICD-10-CM

## 2021-03-25 ENCOUNTER — HOSPITAL ENCOUNTER (OUTPATIENT)
Dept: CT IMAGING | Age: 61
Discharge: HOME | End: 2021-03-25
Attending: INTERNAL MEDICINE
Payer: COMMERCIAL

## 2021-03-25 ENCOUNTER — HOSPITAL ENCOUNTER (EMERGENCY)
Age: 61
Discharge: HOME | End: 2021-03-25
Attending: EMERGENCY MEDICINE
Payer: COMMERCIAL

## 2021-03-25 VITALS
RESPIRATION RATE: 20 BRPM | HEIGHT: 66 IN | HEART RATE: 95 BPM | TEMPERATURE: 98.1 F | BODY MASS INDEX: 28.77 KG/M2 | OXYGEN SATURATION: 100 % | DIASTOLIC BLOOD PRESSURE: 96 MMHG | SYSTOLIC BLOOD PRESSURE: 138 MMHG | WEIGHT: 179 LBS

## 2021-03-25 DIAGNOSIS — T78.40XA ALLERGIC REACTION, INITIAL ENCOUNTER: Primary | ICD-10-CM

## 2021-03-25 DIAGNOSIS — R91.1 PULMONARY NODULE: ICD-10-CM

## 2021-03-25 LAB — CREAT UR-MCNC: 0.8 MG/DL (ref 0.6–1.3)

## 2021-03-25 PROCEDURE — 82565 ASSAY OF CREATININE: CPT

## 2021-03-25 PROCEDURE — 99283 EMERGENCY DEPT VISIT LOW MDM: CPT

## 2021-03-25 PROCEDURE — 74011636637 HC RX REV CODE- 636/637: Performed by: EMERGENCY MEDICINE

## 2021-03-25 PROCEDURE — 74011250637 HC RX REV CODE- 250/637: Performed by: EMERGENCY MEDICINE

## 2021-03-25 PROCEDURE — 74011000636 HC RX REV CODE- 636: Performed by: INTERNAL MEDICINE

## 2021-03-25 PROCEDURE — 71260 CT THORAX DX C+: CPT

## 2021-03-25 RX ORDER — DIPHENHYDRAMINE HCL 50 MG
50 CAPSULE ORAL
Status: COMPLETED | OUTPATIENT
Start: 2021-03-25 | End: 2021-03-25

## 2021-03-25 RX ORDER — PREDNISONE 20 MG/1
60 TABLET ORAL
Status: COMPLETED | OUTPATIENT
Start: 2021-03-25 | End: 2021-03-25

## 2021-03-25 RX ORDER — FAMOTIDINE 20 MG/1
20 TABLET, FILM COATED ORAL
Status: COMPLETED | OUTPATIENT
Start: 2021-03-25 | End: 2021-03-25

## 2021-03-25 RX ORDER — PREDNISONE 20 MG/1
40 TABLET ORAL DAILY
Qty: 6 TAB | Refills: 0 | Status: SHIPPED | OUTPATIENT
Start: 2021-03-26 | End: 2021-03-29

## 2021-03-25 RX ORDER — DIPHENHYDRAMINE HCL 25 MG
25 TABLET ORAL
Qty: 15 TAB | Refills: 0 | Status: SHIPPED | OUTPATIENT
Start: 2021-03-25 | End: 2022-10-25

## 2021-03-25 RX ADMIN — DIPHENHYDRAMINE HYDROCHLORIDE 50 MG: 50 CAPSULE ORAL at 09:23

## 2021-03-25 RX ADMIN — IOPAMIDOL 80 ML: 612 INJECTION, SOLUTION INTRAVENOUS at 07:56

## 2021-03-25 RX ADMIN — FAMOTIDINE 20 MG: 20 TABLET, FILM COATED ORAL at 09:23

## 2021-03-25 RX ADMIN — PREDNISONE 60 MG: 20 TABLET ORAL at 09:23

## 2021-03-25 NOTE — ED PROVIDER NOTES
EMERGENCY DEPARTMENT HISTORY AND PHYSICAL EXAM    8:30 AM      Date: 3/25/2021  Patient Name: Mireille Patel    History of Presenting Illness     Chief Complaint   Patient presents with    Allergic Reaction         History Provided By: Patient    Additional History (Context): Mireille Patel is a 62 y.o. female with Past medical history of pulmonary nodule, chronic bilateral low back pain who presents with chief complaint of itchy rash on her face and arms that started shortly after she left from getting a CT scan with IV contrast.  She states however that she has had IV contrast multiple times over the years and has not been allergic to it at all. She denies any other new products. She feels puffiness over her face and itching arms. She denies any shortness of breath, no sense of her throat closing, no tongue swelling, nausea, wheezing, and no other complaint. PCP: Padmini Burciaga MD        Past History     Past Medical History:  Past Medical History:   Diagnosis Date    High cholesterol     Hypertension        Past Surgical History:  Past Surgical History:   Procedure Laterality Date    HX BREAST LUMPECTOMY Left 1973    benign tumor removed x3    HX BREAST LUMPECTOMY Right 1979    beingn tumor removed    HX ORTHOPAEDIC Right 1980    benign tumor removed    HX TUBAL LIGATION  1982       Family History:  Family History   Problem Relation Age of Onset    Hypertension Mother     Kidney Disease Mother     Hypertension Father     Heart Disease Father        Social History:  Social History     Tobacco Use    Smoking status: Current Every Day Smoker     Packs/day: 0.50     Years: 25.00     Pack years: 12.50     Types: Cigarettes    Smokeless tobacco: Never Used   Substance Use Topics    Alcohol use: No    Drug use: No       Allergies: Allergies   Allergen Reactions    Iodine Hives     Patient noticed a hive on her eyelid while driving home and came back to the ED for treatment. 3/25/2021. Notified by Samuel García RN at [phone]. MRP         Review of Systems       Review of Systems   Constitutional: Negative for chills and fever. HENT: Negative for congestion, rhinorrhea, sore throat and trouble swallowing. Eyes: Negative for visual disturbance. Respiratory: Negative for cough, shortness of breath and wheezing. Cardiovascular: Negative for chest pain. Gastrointestinal: Negative for abdominal pain, nausea and vomiting. Endocrine: Negative for polyuria. Musculoskeletal: Negative for arthralgias and neck stiffness. Skin: Positive for rash. Negative for pallor. Itchy face and arms   Neurological: Negative for dizziness, weakness, numbness and headaches. Psychiatric/Behavioral: Negative for confusion and dysphoric mood. All other systems reviewed and are negative. Physical Exam     Visit Vitals  BP (!) 138/96 (BP 1 Location: Left upper arm)   Pulse 95   Temp 98.1 °F (36.7 °C)   Resp 20   Ht 5' 6\" (1.676 m)   Wt 81.2 kg (179 lb)   SpO2 100%   BMI 28.89 kg/m²         Physical Exam  Vitals signs and nursing note reviewed. Constitutional:       General: She is not in acute distress. Appearance: She is well-developed. She is not diaphoretic. HENT:      Head: Normocephalic and atraumatic. Mouth/Throat:      Pharynx: No oropharyngeal exudate or posterior oropharyngeal erythema. Comments: Airways patent    Speaking full sentences  Eyes:      General: No scleral icterus. Extraocular Movements: Extraocular movements intact. Conjunctiva/sclera: Conjunctivae normal.      Pupils: Pupils are equal, round, and reactive to light. Comments: Eyelid redness and puffiness   Neck:      Musculoskeletal: Normal range of motion and neck supple. Cardiovascular:      Rate and Rhythm: Normal rate. Pulses: Normal pulses. Heart sounds: Normal heart sounds.       Comments: Capillary refill < 3 seconds  Pulmonary:      Effort: Pulmonary effort is normal. No respiratory distress. Breath sounds: Normal breath sounds. No wheezing. Abdominal:      General: There is no distension. Musculoskeletal: Normal range of motion. Lymphadenopathy:      Cervical: No cervical adenopathy. Skin:     General: Skin is warm and dry. Coloration: Skin is not jaundiced. Findings: Erythema and rash present. Comments: Some redness on her bilateral upper extremities/hands with some excoriation from her scratching. Redness and puffiness of her eyelids    No tongue swelling, airway patent. No vesicles or other lesions on her skin   Neurological:      Mental Status: She is alert and oriented to person, place, and time. Cranial Nerves: No cranial nerve deficit. Diagnostic Study Results     Labs -  Recent Results (from the past 12 hour(s))   CREATININE, POC    Collection Time: 03/25/21  7:49 AM   Result Value Ref Range    Creatinine, POC 0.8 0.6 - 1.3 MG/DL    GFRAA, POC >60 >60 ml/min/1.73m2    GFRNA, POC >60 >60 ml/min/1.73m2       Radiologic Studies -   No orders to display         Medical Decision Making   I am the first provider for this patient. I reviewed the vital signs, available nursing notes, past medical history, past surgical history, family history and social history. Vital Signs-Reviewed the patient's vital signs. Pulse Oximetry Analysis -  100% on room air (Interpretation) normal        Records Reviewed: Nursing Notes and Old Medical Records (Time of Review: 8:41 AM)    Provider Notes (Medical Decision Making): DDx: Allergic reaction, contact dermatitis    Possible reaction to IV contrast, however she is never been allergic in the past and has had contrast several times over the years. Airway is patent    We will give p.o.  Benadryl, prednisone, Pepcid    MDM    Medications   predniSONE (DELTASONE) tablet 60 mg (60 mg Oral Given 3/25/21 0923)   diphenhydrAMINE (BENADRYL) capsule 50 mg (50 mg Oral Given 3/25/21 0923) famotidine (PEPCID) tablet 20 mg (20 mg Oral Given 3/25/21 7753)       ED Course: Progress Notes, Reevaluation, and Consults:  Reassessed patient and now the puffiness and redness of her face has resolved. She states she is feeling much better, itching is resolved. I have reassessed the patient. I have discussed the workup, results and plan with the patient and patient is in agreement. Patient is feeling better. Patient will be prescribed Benadryl, prednisone. Patient was discharge in stable condition. Patient was given outpatient follow up. Patient is to return to emergency department if any new or worsening condition. Diagnosis     Clinical Impression:   1. Allergic reaction, initial encounter        Disposition: Discharged    Follow-up Information     Follow up With Specialties Details Why Contact Info    Bernice Shearer MD Internal Medicine Schedule an appointment as soon as possible for a visit in 3 days  [address] [place] [address]      [id number] [place] [address] EMERGENCY DEPT Emergency Medicine  As needed, If symptoms worsen [address] [id number]  [phone]           Patient's Medications   Start Taking    DIPHENHYDRAMINE (BENADRYL ALLERGY) 25 MG TABLET    Take 1 Tab by mouth every six (6) hours as needed for Itching or Skin Irritation. Indications: an allergic reaction    PREDNISONE (DELTASONE) 20 MG TABLET    Take 40 mg by mouth daily for 3 days. Start this medication on Friday, 3/26/2021. Take with Breakfast   Continue Taking    ATORVASTATIN (LIPITOR) 40 MG TABLET    TAKE 1 TABLET BY MOUTH EVERY EVENING    CYCLOBENZAPRINE (FLEXERIL) 10 MG TABLET    TAKE 1 TAB BY MOUTH NIGHTLY AS NEEDED FOR MUSCLE SPASM(S). DICLOFENAC EC (VOLTAREN) 75 MG EC TABLET    Take 1 Tab by mouth daily as needed.     GABAPENTIN (NEURONTIN) 300 MG CAPSULE    TAKE 1 CAPSULE (300 MG) BY ORAL ROUTE 2 TIMES PER DAY    LISINOPRIL-HYDROCHLOROTHIAZIDE (PRINZIDE, ZESTORETIC) 20-12.5 MG PER TABLET    TAKE ONE TABLET BY MOUTH ONCE A DAY    PANTOPRAZOLE (PROTONIX) 40 MG TABLET    TAKE ONE TABLET BY MOUTH ONCE A DAY   These Medications have changed    No medications on file   Stop Taking    BUPROPION XL (WELLBUTRIN XL) 150 MG TABLET    Take 150 mg by mouth daily.    PREDNISONE (DELTASONE) 10 MG TABLET    Take 10 mg by mouth. Dose pack    SIMVASTATIN (ZOCOR) 40 MG TABLET    TAKE ONE TABLET BY MOUTH TABLET EVERY DAY IN THE EVENING         DO Mami Kelly medical dictation software was used for portions of this report.   Unintended transcription errors may occur.     My signature above authenticates this document and my orders, the final    diagnosis (es), discharge prescription (s), and instructions in the Epic    record.

## 2021-03-25 NOTE — ED TRIAGE NOTES
Pt had a CT scan of her lungs earlier. Was driving home and started breaking out in a rash.  C/o itching